# Patient Record
Sex: FEMALE | Race: WHITE | Employment: OTHER | URBAN - METROPOLITAN AREA
[De-identification: names, ages, dates, MRNs, and addresses within clinical notes are randomized per-mention and may not be internally consistent; named-entity substitution may affect disease eponyms.]

---

## 2017-11-10 PROCEDURE — 83036 HEMOGLOBIN GLYCOSYLATED A1C: CPT | Performed by: FAMILY MEDICINE

## 2018-03-02 PROBLEM — K60.2 ANAL FISSURE: Status: ACTIVE | Noted: 2018-03-02

## 2018-10-03 PROCEDURE — 81003 URINALYSIS AUTO W/O SCOPE: CPT | Performed by: COLON & RECTAL SURGERY

## 2018-10-29 RX ORDER — HYDROCODONE BITARTRATE AND ACETAMINOPHEN 10; 325 MG/1; MG/1
1 TABLET ORAL EVERY 6 HOURS PRN
COMMUNITY
End: 2018-12-12

## 2018-11-02 ENCOUNTER — ANESTHESIA (OUTPATIENT)
Dept: SURGERY | Facility: HOSPITAL | Age: 68
End: 2018-11-02
Payer: MEDICARE

## 2018-11-02 ENCOUNTER — HOSPITAL ENCOUNTER (OUTPATIENT)
Facility: HOSPITAL | Age: 68
Setting detail: HOSPITAL OUTPATIENT SURGERY
Discharge: HOME | End: 2018-11-02
Attending: COLON & RECTAL SURGERY | Admitting: COLON & RECTAL SURGERY
Payer: MEDICARE

## 2018-11-02 ENCOUNTER — ANESTHESIA EVENT (OUTPATIENT)
Dept: SURGERY | Facility: HOSPITAL | Age: 68
End: 2018-11-02
Payer: MEDICARE

## 2018-11-02 VITALS
WEIGHT: 179.13 LBS | OXYGEN SATURATION: 95 % | TEMPERATURE: 99 F | RESPIRATION RATE: 16 BRPM | SYSTOLIC BLOOD PRESSURE: 135 MMHG | DIASTOLIC BLOOD PRESSURE: 73 MMHG | HEART RATE: 60 BPM | BODY MASS INDEX: 30.58 KG/M2 | HEIGHT: 64 IN

## 2018-11-02 DIAGNOSIS — K80.20 CALCULUS OF GALLBLADDER WITHOUT CHOLECYSTITIS WITHOUT OBSTRUCTION: ICD-10-CM

## 2018-11-02 PROCEDURE — 0FT44ZZ RESECTION OF GALLBLADDER, PERCUTANEOUS ENDOSCOPIC APPROACH: ICD-10-PCS | Performed by: COLON & RECTAL SURGERY

## 2018-11-02 PROCEDURE — 88304 TISSUE EXAM BY PATHOLOGIST: CPT | Performed by: COLON & RECTAL SURGERY

## 2018-11-02 RX ORDER — LABETALOL HYDROCHLORIDE 5 MG/ML
INJECTION, SOLUTION INTRAVENOUS AS NEEDED
Status: DISCONTINUED | OUTPATIENT
Start: 2018-11-02 | End: 2018-11-02 | Stop reason: SURG

## 2018-11-02 RX ORDER — LIDOCAINE HYDROCHLORIDE 10 MG/ML
INJECTION, SOLUTION EPIDURAL; INFILTRATION; INTRACAUDAL; PERINEURAL AS NEEDED
Status: DISCONTINUED | OUTPATIENT
Start: 2018-11-02 | End: 2018-11-02 | Stop reason: SURG

## 2018-11-02 RX ORDER — DEXAMETHASONE SODIUM PHOSPHATE 4 MG/ML
VIAL (ML) INJECTION AS NEEDED
Status: DISCONTINUED | OUTPATIENT
Start: 2018-11-02 | End: 2018-11-02 | Stop reason: SURG

## 2018-11-02 RX ORDER — GLYCOPYRROLATE 0.2 MG/ML
INJECTION INTRAMUSCULAR; INTRAVENOUS AS NEEDED
Status: DISCONTINUED | OUTPATIENT
Start: 2018-11-02 | End: 2018-11-02 | Stop reason: SURG

## 2018-11-02 RX ORDER — HYDROCODONE BITARTRATE AND ACETAMINOPHEN 5; 325 MG/1; MG/1
2 TABLET ORAL AS NEEDED
Status: DISCONTINUED | OUTPATIENT
Start: 2018-11-02 | End: 2018-11-02

## 2018-11-02 RX ORDER — MORPHINE SULFATE 4 MG/ML
2 INJECTION, SOLUTION INTRAMUSCULAR; INTRAVENOUS EVERY 10 MIN PRN
Status: DISCONTINUED | OUTPATIENT
Start: 2018-11-02 | End: 2018-11-02

## 2018-11-02 RX ORDER — HALOPERIDOL 5 MG/ML
0.25 INJECTION INTRAMUSCULAR ONCE AS NEEDED
Status: DISCONTINUED | OUTPATIENT
Start: 2018-11-02 | End: 2018-11-02

## 2018-11-02 RX ORDER — FAMOTIDINE 20 MG/1
20 TABLET ORAL ONCE
Status: COMPLETED | OUTPATIENT
Start: 2018-11-02 | End: 2018-11-02

## 2018-11-02 RX ORDER — HYDROCODONE BITARTRATE AND ACETAMINOPHEN 5; 325 MG/1; MG/1
1 TABLET ORAL AS NEEDED
Status: DISCONTINUED | OUTPATIENT
Start: 2018-11-02 | End: 2018-11-02

## 2018-11-02 RX ORDER — ACETAMINOPHEN 500 MG
1000 TABLET ORAL ONCE
Status: COMPLETED | OUTPATIENT
Start: 2018-11-02 | End: 2018-11-02

## 2018-11-02 RX ORDER — ONDANSETRON 2 MG/ML
INJECTION INTRAMUSCULAR; INTRAVENOUS AS NEEDED
Status: DISCONTINUED | OUTPATIENT
Start: 2018-11-02 | End: 2018-11-02 | Stop reason: SURG

## 2018-11-02 RX ORDER — NALOXONE HYDROCHLORIDE 0.4 MG/ML
80 INJECTION, SOLUTION INTRAMUSCULAR; INTRAVENOUS; SUBCUTANEOUS AS NEEDED
Status: DISCONTINUED | OUTPATIENT
Start: 2018-11-02 | End: 2018-11-02

## 2018-11-02 RX ORDER — MAGNESIUM HYDROXIDE 1200 MG/15ML
LIQUID ORAL CONTINUOUS PRN
Status: COMPLETED | OUTPATIENT
Start: 2018-11-02 | End: 2018-11-02

## 2018-11-02 RX ORDER — HEPARIN SODIUM 5000 [USP'U]/ML
5000 INJECTION, SOLUTION INTRAVENOUS; SUBCUTANEOUS ONCE
Status: COMPLETED | OUTPATIENT
Start: 2018-11-02 | End: 2018-11-02

## 2018-11-02 RX ORDER — CEFAZOLIN SODIUM/WATER 2 G/20 ML
2 SYRINGE (ML) INTRAVENOUS ONCE
Status: COMPLETED | OUTPATIENT
Start: 2018-11-02 | End: 2018-11-02

## 2018-11-02 RX ORDER — NEOSTIGMINE METHYLSULFATE 0.5 MG/ML
INJECTION INTRAVENOUS AS NEEDED
Status: DISCONTINUED | OUTPATIENT
Start: 2018-11-02 | End: 2018-11-02 | Stop reason: SURG

## 2018-11-02 RX ORDER — MIDAZOLAM HYDROCHLORIDE 1 MG/ML
INJECTION INTRAMUSCULAR; INTRAVENOUS AS NEEDED
Status: DISCONTINUED | OUTPATIENT
Start: 2018-11-02 | End: 2018-11-02 | Stop reason: SURG

## 2018-11-02 RX ORDER — HYDROCODONE BITARTRATE AND ACETAMINOPHEN 5; 325 MG/1; MG/1
1 TABLET ORAL EVERY 4 HOURS PRN
Qty: 30 TABLET | Refills: 0 | Status: SHIPPED | OUTPATIENT
Start: 2018-11-02 | End: 2018-11-12

## 2018-11-02 RX ORDER — METOPROLOL TARTRATE 5 MG/5ML
2.5 INJECTION INTRAVENOUS ONCE
Status: DISCONTINUED | OUTPATIENT
Start: 2018-11-02 | End: 2018-11-02

## 2018-11-02 RX ORDER — METOCLOPRAMIDE 10 MG/1
10 TABLET ORAL ONCE
Status: COMPLETED | OUTPATIENT
Start: 2018-11-02 | End: 2018-11-02

## 2018-11-02 RX ORDER — SODIUM CHLORIDE, SODIUM LACTATE, POTASSIUM CHLORIDE, CALCIUM CHLORIDE 600; 310; 30; 20 MG/100ML; MG/100ML; MG/100ML; MG/100ML
INJECTION, SOLUTION INTRAVENOUS CONTINUOUS
Status: DISCONTINUED | OUTPATIENT
Start: 2018-11-02 | End: 2018-11-02

## 2018-11-02 RX ORDER — ROCURONIUM BROMIDE 10 MG/ML
INJECTION, SOLUTION INTRAVENOUS AS NEEDED
Status: DISCONTINUED | OUTPATIENT
Start: 2018-11-02 | End: 2018-11-02 | Stop reason: SURG

## 2018-11-02 RX ORDER — MORPHINE SULFATE 4 MG/ML
4 INJECTION, SOLUTION INTRAMUSCULAR; INTRAVENOUS EVERY 10 MIN PRN
Status: DISCONTINUED | OUTPATIENT
Start: 2018-11-02 | End: 2018-11-02

## 2018-11-02 RX ORDER — MORPHINE SULFATE 10 MG/ML
6 INJECTION, SOLUTION INTRAMUSCULAR; INTRAVENOUS EVERY 10 MIN PRN
Status: DISCONTINUED | OUTPATIENT
Start: 2018-11-02 | End: 2018-11-02

## 2018-11-02 RX ORDER — ONDANSETRON 2 MG/ML
4 INJECTION INTRAMUSCULAR; INTRAVENOUS ONCE AS NEEDED
Status: DISCONTINUED | OUTPATIENT
Start: 2018-11-02 | End: 2018-11-02

## 2018-11-02 RX ORDER — BUPIVACAINE HYDROCHLORIDE AND EPINEPHRINE 5; 5 MG/ML; UG/ML
INJECTION, SOLUTION PERINEURAL AS NEEDED
Status: DISCONTINUED | OUTPATIENT
Start: 2018-11-02 | End: 2018-11-02 | Stop reason: HOSPADM

## 2018-11-02 RX ADMIN — DEXAMETHASONE SODIUM PHOSPHATE 4 MG: 4 MG/ML VIAL (ML) INJECTION at 09:19:00

## 2018-11-02 RX ADMIN — ROCURONIUM BROMIDE 30 MG: 10 INJECTION, SOLUTION INTRAVENOUS at 09:19:00

## 2018-11-02 RX ADMIN — SODIUM CHLORIDE, SODIUM LACTATE, POTASSIUM CHLORIDE, CALCIUM CHLORIDE: 600; 310; 30; 20 INJECTION, SOLUTION INTRAVENOUS at 10:25:00

## 2018-11-02 RX ADMIN — LIDOCAINE HYDROCHLORIDE 50 MG: 10 INJECTION, SOLUTION EPIDURAL; INFILTRATION; INTRACAUDAL; PERINEURAL at 09:19:00

## 2018-11-02 RX ADMIN — GLYCOPYRROLATE 0.6 MG: 0.2 INJECTION INTRAMUSCULAR; INTRAVENOUS at 10:20:00

## 2018-11-02 RX ADMIN — MIDAZOLAM HYDROCHLORIDE 2 MG: 1 INJECTION INTRAMUSCULAR; INTRAVENOUS at 09:19:00

## 2018-11-02 RX ADMIN — NEOSTIGMINE METHYLSULFATE 3 MG: 0.5 INJECTION INTRAVENOUS at 10:20:00

## 2018-11-02 RX ADMIN — CEFAZOLIN SODIUM/WATER 2 G: 2 G/20 ML SYRINGE (ML) INTRAVENOUS at 09:20:00

## 2018-11-02 RX ADMIN — LABETALOL HYDROCHLORIDE 10 MG: 5 INJECTION, SOLUTION INTRAVENOUS at 09:48:00

## 2018-11-02 RX ADMIN — ONDANSETRON 4 MG: 2 INJECTION INTRAMUSCULAR; INTRAVENOUS at 09:19:00

## 2018-11-02 RX ADMIN — SODIUM CHLORIDE, SODIUM LACTATE, POTASSIUM CHLORIDE, CALCIUM CHLORIDE: 600; 310; 30; 20 INJECTION, SOLUTION INTRAVENOUS at 09:19:00

## 2018-11-02 NOTE — ANESTHESIA PROCEDURE NOTES
ANESTHESIA INTUBATION  Date/Time: 11/2/2018 9:25 AM  Urgency: elective    Airway not difficult    General Information and Staff    Patient location during procedure: OR  Anesthesiologist: Madeline Phillips MD  Performed: anesthesiologist     Indications and P

## 2018-11-02 NOTE — H&P
[facility]  Preop H&P - Colon and Rectal Surgery  Lola Sykes MD    CHIEF COMPLAINT:  Biliary colic     HISTORY OF PRESENT ILLNESS:  Tyler Campos is a 79year old female who presents for evaluation of gallbladder disease.     She has had a \"gas Crohn's disease   • Cancer Brother       Social History    Tobacco Use      Smoking status: Never Smoker      Smokeless tobacco: Never Used    Alcohol use: Yes      Comment: Socially    Drug use: No           CURRENT MEDICATIONS:      No current outpatient Appearance:  normal     Behavior:  normal     Attitude toward examiner:  normal     Affect: normal     Judgment:  Normal    DATA:     US ABDOMEN RUQ 9/6/18  CLINICAL INDICATION: Heartburn. COMPARISON STUDY: None available.   FINDINGS:  PANCREAS: Limited e ASSESSMENT AND PLAN:  Gallstones and thickened wall  Abdominal pain, atypical for biliary source     Other sources excluded with UA and CT    Ready to proceed with laparoscopic cholecystectomy      Procedure, indications, risks, benefits, and alt

## 2018-11-02 NOTE — ANESTHESIA POSTPROCEDURE EVALUATION
Patient: Janna Caraballo    Procedure Summary     Date:  11/02/18 Room / Location:  [facility] OR 02 / [facility] OR    Anesthesia Start:  7465 Anesthesia Stop:  6324    Procedure:  LAPAROSCOPIC CHOLECYSTECTOMY (N/A ) Diagnosis:       Calculus of gallbladder wit

## 2018-11-02 NOTE — ANESTHESIA PREPROCEDURE EVALUATION
Anesthesia PreOp Note    HPI:     Kylee Kent is a 79year old female who presents for preoperative consultation requested by: Sofia Mcpherson MD    Date of Surgery: 11/2/2018    Procedure(s):  LAPAROSCOPIC CHOLECYSTECTOMY  Indication: Calculus of gallbladd 11/1/2018 at 2000   HYDROcodone-acetaminophen  MG Oral Tab Take 1 tablet by mouth every 6 (six) hours as needed for Pain. Disp:  Rfl:  11/1/2018 at 2100   famoTIDine 20 MG Oral Tab Take 40 mg by mouth nightly.    Disp:  Rfl:  11/1/2018 at [address] [place] [address] Social History    Socioeconomic History      Marital status:       Spouse name: Not on file      Number of children: Not on file      Years of education: Not on file      Highest education level: Not on file    Social Needs      Financial resour Airway   Mallampati: II  TM distance: >3 FB  Neck ROM: full  Dental      Pulmonary - negative ROS and normal exam   Cardiovascular - normal exam  (+) hypertension,     Neuro/Psych - negative ROS     GI/Hepatic/Renal    (+) GERD,     Endo/Other    (+) hyper

## 2018-11-02 NOTE — DISCHARGE SUMMARY
Outpatient Surgery Brief Discharge Summary         Patient ID:  Ky Valdes  [id number]  79year old  7/29/1950    Discharge Diagnoses: Calculus of gallbladder without cholecystitis without obstruction [K80.20]    Procedures: Laparoscopic cholecystectomy

## 2018-11-02 NOTE — OPERATIVE REPORT
Operative Note    Patient Name: Carlton Walton    Preoperative Diagnosis: Calculus of gallbladder without cholecystitis without obstruction [K80.20]    Postoperative Diagnosis: same    Primary Surgeon: Ephraim Puri    Assistant: Sara Gregg [facility]    Procedures: patient positioning, prepping, instrument passing and holding, retraction, suturing, and camera holding.)        _____________________________________   Attending Surgeon: Lázaro Leyva MD   Dictated By: Lázaro Leyva MD

## 2018-12-12 PROCEDURE — 83036 HEMOGLOBIN GLYCOSYLATED A1C: CPT | Performed by: FAMILY MEDICINE

## 2021-11-18 ENCOUNTER — HOSPITAL ENCOUNTER (INPATIENT)
Facility: HOSPITAL | Age: 71
LOS: 2 days | Discharge: HOME | DRG: 392 | End: 2021-11-20
Attending: EMERGENCY MEDICINE | Admitting: HOSPITALIST
Payer: MEDICARE

## 2021-11-18 ENCOUNTER — APPOINTMENT (OUTPATIENT)
Dept: CT IMAGING | Facility: HOSPITAL | Age: 71
DRG: 392 | End: 2021-11-18
Attending: EMERGENCY MEDICINE
Payer: MEDICARE

## 2021-11-18 ENCOUNTER — APPOINTMENT (OUTPATIENT)
Dept: ULTRASOUND IMAGING | Facility: HOSPITAL | Age: 71
DRG: 392 | End: 2021-11-18
Attending: OBSTETRICS & GYNECOLOGY
Payer: MEDICARE

## 2021-11-18 DIAGNOSIS — K59.00 CONSTIPATION, UNSPECIFIED CONSTIPATION TYPE: ICD-10-CM

## 2021-11-18 DIAGNOSIS — R19.00 PELVIC MASS: Primary | ICD-10-CM

## 2021-11-18 PROCEDURE — 36415 COLL VENOUS BLD VENIPUNCTURE: CPT

## 2021-11-18 PROCEDURE — 74177 CT ABD & PELVIS W/CONTRAST: CPT | Performed by: EMERGENCY MEDICINE

## 2021-11-18 PROCEDURE — 76856 US EXAM PELVIC COMPLETE: CPT | Performed by: OBSTETRICS & GYNECOLOGY

## 2021-11-18 PROCEDURE — 86304 IMMUNOASSAY TUMOR CA 125: CPT | Performed by: OBSTETRICS & GYNECOLOGY

## 2021-11-18 PROCEDURE — 85025 COMPLETE CBC W/AUTO DIFF WBC: CPT | Performed by: EMERGENCY MEDICINE

## 2021-11-18 PROCEDURE — 99285 EMERGENCY DEPT VISIT HI MDM: CPT

## 2021-11-18 PROCEDURE — 82378 CARCINOEMBRYONIC ANTIGEN: CPT | Performed by: OBSTETRICS & GYNECOLOGY

## 2021-11-18 PROCEDURE — 80048 BASIC METABOLIC PNL TOTAL CA: CPT | Performed by: EMERGENCY MEDICINE

## 2021-11-18 PROCEDURE — 81001 URINALYSIS AUTO W/SCOPE: CPT | Performed by: EMERGENCY MEDICINE

## 2021-11-18 RX ORDER — POTASSIUM CHLORIDE 20 MEQ/1
40 TABLET, EXTENDED RELEASE ORAL ONCE
Status: COMPLETED | OUTPATIENT
Start: 2021-11-18 | End: 2021-11-18

## 2021-11-18 RX ORDER — HYDROCODONE BITARTRATE AND ACETAMINOPHEN 5; 325 MG/1; MG/1
1 TABLET ORAL EVERY 4 HOURS PRN
Status: DISCONTINUED | OUTPATIENT
Start: 2021-11-18 | End: 2021-11-20

## 2021-11-18 RX ORDER — ATORVASTATIN CALCIUM 10 MG/1
10 TABLET, FILM COATED ORAL NIGHTLY
Status: DISCONTINUED | OUTPATIENT
Start: 2021-11-18 | End: 2021-11-20

## 2021-11-18 RX ORDER — ONDANSETRON 2 MG/ML
4 INJECTION INTRAMUSCULAR; INTRAVENOUS EVERY 6 HOURS PRN
Status: DISCONTINUED | OUTPATIENT
Start: 2021-11-18 | End: 2021-11-20

## 2021-11-18 RX ORDER — METOPROLOL SUCCINATE 25 MG/1
25 TABLET, EXTENDED RELEASE ORAL DAILY
Status: DISCONTINUED | OUTPATIENT
Start: 2021-11-19 | End: 2021-11-20

## 2021-11-18 RX ORDER — POLYETHYLENE GLYCOL 3350 17 G/17G
17 POWDER, FOR SOLUTION ORAL DAILY
Status: DISCONTINUED | OUTPATIENT
Start: 2021-11-18 | End: 2021-11-20

## 2021-11-18 RX ORDER — DEXTROSE AND SODIUM CHLORIDE 5; .45 G/100ML; G/100ML
INJECTION, SOLUTION INTRAVENOUS CONTINUOUS
Status: DISCONTINUED | OUTPATIENT
Start: 2021-11-18 | End: 2021-11-20

## 2021-11-18 RX ORDER — SENNA AND DOCUSATE SODIUM 50; 8.6 MG/1; MG/1
2 TABLET, FILM COATED ORAL 2 TIMES DAILY
Status: DISCONTINUED | OUTPATIENT
Start: 2021-11-18 | End: 2021-11-20

## 2021-11-18 RX ORDER — HYDROCODONE BITARTRATE AND ACETAMINOPHEN 5; 325 MG/1; MG/1
2 TABLET ORAL EVERY 4 HOURS PRN
Status: DISCONTINUED | OUTPATIENT
Start: 2021-11-18 | End: 2021-11-20

## 2021-11-18 RX ORDER — SODIUM PHOSPHATE, DIBASIC AND SODIUM PHOSPHATE, MONOBASIC 7; 19 G/133ML; G/133ML
1 ENEMA RECTAL ONCE
Status: COMPLETED | OUTPATIENT
Start: 2021-11-18 | End: 2021-11-18

## 2021-11-18 RX ORDER — ACETAMINOPHEN 325 MG/1
650 TABLET ORAL EVERY 4 HOURS PRN
Status: DISCONTINUED | OUTPATIENT
Start: 2021-11-18 | End: 2021-11-20

## 2021-11-18 RX ORDER — HEPARIN SODIUM 5000 [USP'U]/ML
5000 INJECTION, SOLUTION INTRAVENOUS; SUBCUTANEOUS EVERY 8 HOURS SCHEDULED
Status: DISCONTINUED | OUTPATIENT
Start: 2021-11-18 | End: 2021-11-20

## 2021-11-18 RX ORDER — METOCLOPRAMIDE HYDROCHLORIDE 5 MG/ML
10 INJECTION INTRAMUSCULAR; INTRAVENOUS EVERY 8 HOURS PRN
Status: DISCONTINUED | OUTPATIENT
Start: 2021-11-18 | End: 2021-11-20

## 2021-11-18 NOTE — ED QUICK NOTES
Orders for admission, patient is aware of plan and ready to go upstairs. Any questions, please call ED RYLEY key at extension [phone].      Type of COVID test sent: rapid negative  COVID Suspicion level: Low/Vaccinated    LOC at time of transport: x 4    Other

## 2021-11-18 NOTE — CONSULTS
GYN Consult:    HPI:    Mrs. Alka Schmid  Is a 72 y/o postmenopausal female who presented to ED with complaint of constipation. Incidental finding on CT imaging shows a pelvic mass. GYN oncology was consulted. Dr. Robbi Torres has asked that I see the patient. de sac or adnexa   EXTREMITIES:  non tender  NEUROLOGIC: grossly intact  SKIN: moist, without lesions   PSYCHIATRIC: alert and oriented x 3; affect appropriate     ASSESSMENT/ PLAN:     Assessment:  1.  CT - necrotic pelvic mas (left) arising from adnexa, c

## 2021-11-18 NOTE — H&P
[facility] Hospitalist History and Physical      CC: Constipation, abd discomfort    PCP: Benito Wall DO    History of Present Illness: Patient is a 73year old female with PMH sig for HTN here with constipation for ~ 6 days.  Prior to developing these symptoms 40 mg by mouth nightly.  , Disp: , Rfl:           Current Meds:  Scheduled:   Continuous Infusions:   PRN:     Allergies:  No Known Allergies     Past Social Hx:  Denies heavy ETOH use  Denies illicts    Fam Hx  Family History   Problem Relation Age of Ons Large heterogeneously enhancing necrotic mass in the left pelvis deviating the uterus and bladder rightward, appears to be arising from the adnexa. Naila Hush is regional left inguinal adenopathy as well as retroperitoneal adenopathy on the left.     Findings

## 2021-11-18 NOTE — ED INITIAL ASSESSMENT (HPI)
Patient here with constipation, last BM on 11/12/21. Patient states she tried metamucil, sennakot and an soap suds enema with no success. Patient states she has hx of hemmorhoids. Patient denies abdominal pain, nausea or vomiting.

## 2021-11-18 NOTE — ED PROVIDER NOTES
Patient Seen in: [facility] AND [facility] Emergency Department      History   Patient presents with:  Constipation    Stated Complaint: constipation    Subjective:   HPI    80-year-old female with history of hypertension and esophageal reflux presents with comp (!) 170/93   Pulse 103   Resp 20   Temp 97.9 °F (36.6 °C)   Temp src    SpO2 99 %   O2 Device None (Room air)       Current:/83   Pulse 72   Temp 97.9 °F (36.6 °C)   Resp 18   Ht 165.1 cm (5' 5\")   Wt 86.2 kg   SpO2 98%   BMI 31.62 kg/m²         Phy Final result                 Please view results for these tests on the individual orders. RAPID SARS-COV-2 BY PCR                 MDM      Lab and CT results noted.   Will admit for further evaluation and treatment of pelvic mass with concern for ovarian

## 2021-11-19 PROCEDURE — 80048 BASIC METABOLIC PNL TOTAL CA: CPT | Performed by: HOSPITALIST

## 2021-11-19 PROCEDURE — 93005 ELECTROCARDIOGRAM TRACING: CPT

## 2021-11-19 PROCEDURE — 85025 COMPLETE CBC W/AUTO DIFF WBC: CPT | Performed by: HOSPITALIST

## 2021-11-19 PROCEDURE — 93010 ELECTROCARDIOGRAM REPORT: CPT | Performed by: HOSPITALIST

## 2021-11-19 PROCEDURE — 87493 C DIFF AMPLIFIED PROBE: CPT | Performed by: HOSPITALIST

## 2021-11-19 RX ORDER — BISACODYL 10 MG
10 SUPPOSITORY, RECTAL RECTAL ONCE
Status: COMPLETED | OUTPATIENT
Start: 2021-11-19 | End: 2021-11-19

## 2021-11-19 RX ORDER — SODIUM PHOSPHATE, DIBASIC AND SODIUM PHOSPHATE, MONOBASIC 7; 19 G/133ML; G/133ML
1 ENEMA RECTAL 2 TIMES DAILY
Status: COMPLETED | OUTPATIENT
Start: 2021-11-19 | End: 2021-11-20

## 2021-11-19 RX ORDER — POTASSIUM CHLORIDE 20 MEQ/1
40 TABLET, EXTENDED RELEASE ORAL ONCE
Status: COMPLETED | OUTPATIENT
Start: 2021-11-19 | End: 2021-11-19

## 2021-11-19 NOTE — PLAN OF CARE
VSS. Room Air. Remote tele in place with no calls. Ambulating self, independent after set up. Tolerating clears. IVFs infusing through L-AC PIV. Denies n/v. No c/o pain. Voiding. Fleet enema given. Two small loose BM.  Stool sample collected per protocol - stability  Description: INTERVENTIONS:  - Monitor vital signs, rhythm, and trends  - Monitor for bleeding, hypotension and signs of decreased cardiac output  - Evaluate effectiveness of vasoactive medications to optimize hemodynamic stability  - Monitor ar

## 2021-11-19 NOTE — PLAN OF CARE
Pt alert and oriented x4, forgetful at times. On room air. CLD, advanced to FLD, patient vomitted 1x before FLD trial (orange, unwitnessed by staff), reported feeling nauseous. PRN zofran given. MD notified, transitioned to NPO for general sx consult.  Griselda Bristol falls.  - [place] fall precautions as indicated by assessment.  - Educate pt/family on patient safety including physical limitations  - Instruct pt to call for assistance with activity based on assessment  - Modify environment to reduce risk of injury  - Progressing  Goal: Maintains or returns to baseline bowel function  Description: INTERVENTIONS:  - Assess bowel function  - Maintain adequate hydration with IV or PO as ordered and tolerated  - Evaluate effectiveness of GI medications  - Encourage mobiliza

## 2021-11-19 NOTE — CONSULTS
Urology Consultation  [facility] Emergency Department Urology    Place of Service: [facility] AND CLINICS  Reason for Consultation: Gross hematuria   Consult Requested by: Genny Holter, MD    History of Present Illness:  Silvana Calero is a 75F with PMHx HTN, hyperth enlargement.     KIDNEYS: Right kidney unremarkable.  Left kidney is without hydronephrosis and demonstrates normal cortical enhancement, however, there is mild left hydroureter which appears to be related to a left lower quadrant mass.    AORTA/VASCULAR:    The mass measures   approximately 13.6 x 8.2 x 9.3 cm.    BONES:   Mild facet arthropathy of the lower lumbar levels.  Large Schmorl's node at the superior endplate of L3.  Diffuse idiopathic skeletal hyperostosis of the lower thoracic spine.  Moderate to - responds to claritin   • HYPERTENSION    • HYPERTHYROIDISM     resolved    • Unspecified essential hypertension    • Unspecified menopausal and postmenopausal disorder     1999       Past Surgical History:   Procedure Laterality Date   • COLONOSCOPY tablet, Rfl: 0  HYDROCHLOROTHIAZIDE 25 MG Oral Tab, TAKE 1 TABLET(25 MG) BY MOUTH DAILY, Disp: 90 tablet, Rfl: 0  PRAVASTATIN 40 MG Oral Tab, TAKE 1 TABLET(40 MG) BY MOUTH EVERY NIGHT, Disp: 90 tablet, Rfl: 0  zolpidem 5 MG Oral Tab, Take 1 tablet (5 mg to stent placement for ureteral identification; possible left ureteral reimplant with psoas hitch, pending mass adherence to left ureter.     Josh Oliva MD  [facility] Urology

## 2021-11-19 NOTE — PROGRESS NOTES
Progress Note:    Pelvic sono confirms left adnexal masses: 1st is 9J9R3me and the other 6X2C1en - complex. Suspicion for malignancy. CEA,  are not elevated. Plan:    Follow up with GYN oncology 1 week after discharge.  No further inpatient manag

## 2021-11-19 NOTE — PROGRESS NOTES
[facility] Hospitalist Progress Note                                                                   [address]  7/29/1950    CC: FU constipation/pelvic mass    Interval History:  - Some small 78 84   GFRNAA 67 73   CA 9.4 9.0   * 133*   K 3.2* 3.1*   CL 97* 96*   CO2 25.0 29.0           ROS: no change to ROS from documentation yesterday, except as otherwise noted in the Interval History above.     Assessment/Plan:    # Constipation  - No c

## 2021-11-20 ENCOUNTER — APPOINTMENT (OUTPATIENT)
Dept: GENERAL RADIOLOGY | Facility: HOSPITAL | Age: 71
DRG: 392 | End: 2021-11-20
Attending: SURGERY
Payer: MEDICARE

## 2021-11-20 VITALS
RESPIRATION RATE: 16 BRPM | TEMPERATURE: 98 F | WEIGHT: 190 LBS | SYSTOLIC BLOOD PRESSURE: 121 MMHG | DIASTOLIC BLOOD PRESSURE: 88 MMHG | OXYGEN SATURATION: 98 % | BODY MASS INDEX: 31.65 KG/M2 | HEART RATE: 65 BPM | HEIGHT: 65 IN

## 2021-11-20 PROCEDURE — 74270 X-RAY XM COLON 1CNTRST STD: CPT | Performed by: SURGERY

## 2021-11-20 PROCEDURE — 80048 BASIC METABOLIC PNL TOTAL CA: CPT | Performed by: HOSPITALIST

## 2021-11-20 PROCEDURE — 84132 ASSAY OF SERUM POTASSIUM: CPT | Performed by: HOSPITALIST

## 2021-11-20 RX ORDER — SENNA AND DOCUSATE SODIUM 50; 8.6 MG/1; MG/1
2 TABLET, FILM COATED ORAL 2 TIMES DAILY
Qty: 180 TABLET | Refills: 0 | Status: SHIPPED | OUTPATIENT
Start: 2021-11-20

## 2021-11-20 RX ORDER — POTASSIUM CHLORIDE 20 MEQ/1
40 TABLET, EXTENDED RELEASE ORAL EVERY 4 HOURS
Status: COMPLETED | OUTPATIENT
Start: 2021-11-20 | End: 2021-11-20

## 2021-11-20 RX ORDER — POTASSIUM CHLORIDE 14.9 MG/ML
20 INJECTION INTRAVENOUS ONCE
Status: DISCONTINUED | OUTPATIENT
Start: 2021-11-20 | End: 2021-11-20

## 2021-11-20 RX ORDER — SODIUM CHLORIDE 9 MG/ML
INJECTION, SOLUTION INTRAVENOUS CONTINUOUS
Status: DISCONTINUED | OUTPATIENT
Start: 2021-11-20 | End: 2021-11-20

## 2021-11-20 RX ORDER — POLYETHYLENE GLYCOL 3350 17 G/17G
17 POWDER, FOR SOLUTION ORAL DAILY
Qty: 30 EACH | Refills: 0 | Status: SHIPPED | OUTPATIENT
Start: 2021-11-21 | End: 2021-11-30

## 2021-11-20 NOTE — PROGRESS NOTES
[facility]    Progress Note    Jethro Morillo Patient Status:  Inpatient    1950 MRN [id number]   Location [facility] 4W/SW/SE Attending Priscila Minor,*   Hosp Day # 2 PCP Pelon Rock, DO Jethro Morillo is a and turgor. Neurologic: Cranial nerves are grossly intact. Motor strength and sensory examination is grossly normal.  No focal neurologic deficit.          Results:       Recent Labs   Lab 11/18/21  1321 11/19/21  0622   RBC 4.93 4.50   HGB 15.1 13.6   HC Meg Montoya MD on 11/18/2021 at 3:10 PM          EKG 12 Lead    Result Date: 11/19/2021  ECG Report  Interpretation  -------------------------- Sinus Rhythm WITHIN NORMAL LIMITS No previous ECGs available Electronically signed on 11/19/2021 at 14:56 by Eber Aleman,

## 2021-11-20 NOTE — PLAN OF CARE
Forgetful. VSS. Room Air. Remote tele in place with no calls. Ambulating self, independent after set up. NPO. IVFs infusing through L-AC PIV. Denies n/v. No emesis. C/o of mild abdominal cramping which improved throughout the night per pt. Voiding.  Fleet CARDIOVASCULAR - ADULT  Goal: Maintains optimal cardiac output and hemodynamic stability  Description: INTERVENTIONS:  - Monitor vital signs, rhythm, and trends  - Monitor for bleeding, hypotension and signs of decreased cardiac output  - Evaluate effectiv

## 2021-11-20 NOTE — PROGRESS NOTES
[facility] Hospitalist Progress Note                                                                   [address]  7/29/1950    CC: FU constipation/pelvic mass    Interval History:  - No large s 25.0 29.0 26.0           ROS: no change to ROS from documentation yesterday, except as otherwise noted in the Interval History above.     Assessment/Plan:    # Constipation  - No clear bowel obstruction on imaging but with large mass occupying space in pelv

## 2021-11-20 NOTE — CONSULTS
[facility] HOSP - [facility]    Report of Consultation    Alka Schmid Patient Status:  Inpatient    1950 MRN [id number]   Location [facility] 4W/SW/SE Attending Edward Mortensen,*   Hosp Day # 1 PCP Denise Kang, DO     Date of Admi Other (Other) Sister         Crohn's disease   • Cancer Brother    • High Blood Pressure Son    • High Blood Pressure Daughter       reports that she has never smoked. She has never used smokeless tobacco. She reports current alcohol use.  She reports that (86.2 kg), SpO2 98 %. General: Alert, orientated x3. Cooperative. No apparent distress. Patient is overweight. HEENT: Exam is unremarkable. Without scleral icterus. Neck: No tenderness to palpitation. No JVD.     Lungs: Clear to auscultation bila regional left inguinal adenopathy as well as retroperitoneal adenopathy on the left. Findings are concerning for an ovarian malignancy with lymph node metastatic disease. 3.  Colonic diverticulosis without evidence for acute diverticulitis.   4. Mild left inflammation. I discussed with patient the options. I think best to try and manually disimpacted the first to see if we can loosen this up. Once we get this out we can give her clindamycin, patient refused with normal saline.   Plan Gastrografin enema in

## 2021-11-20 NOTE — PROGRESS NOTES
Urology- Daily Progress Note    Following for: left adnexal mass with intimate involvement of left distal ureter    Subjective  No overnight events. Just received enema. No flank pain. Voiding without difficulty.      Objective  /77 (BP Location: Rig placement for ureteral identification; possible left ureteral reimplant with psoas hitch, pending mass adherence to left ureter.  - Ongoing management of constipation per primary service, general surgery teams  - Urology will sign off at this time.  Please

## 2021-11-20 NOTE — PLAN OF CARE
Pt alert and oriented x4. VSS. On room air. Tolerating general diet, no nausea. Pt had gastrografin enema + xray today. Diarrhea persists. No complaints of nausea. Abdomen soft. Bowel regimen as ordered. Voiding freely. No complaints of pain.  Cleared for d patient safety including physical limitations  - Instruct pt to call for assistance with activity based on assessment  - Modify environment to reduce risk of injury  - Provide assistive devices as appropriate  - Consider OT/PT consult to assist with streng function  Description: INTERVENTIONS:  - Assess bowel function  - Maintain adequate hydration with IV or PO as ordered and tolerated  - Evaluate effectiveness of GI medications  - Encourage mobilization and activity  - Obtain nutritional consult as needed

## 2021-12-01 NOTE — DISCHARGE SUMMARY
General Medicine Discharge Summary     Patient ID:  Jose Eduardo Sebastian  73year old  7/29/1950    Admit date: 11/18/2021    Discharge date and time:  11/20/21    Attending Physician: No att. providers found Tolerating diet.  ANUJA Shaver for DC today.     # Large pelvic mass, concern for primary ovarian vs other gyn malignancy  - Gyn on consulted- ANUJA Lieberman [facility])  - Gyn service also consulted and have seen in house  - Pelvic US reviewed 2 large Oral Tab          Activity: activity as tolerated  Diet: regular diet  Wound Care: as directed  Code Status: No Order      Exam on day of discharge:      11/20/21  1316   BP: 121/88   Pulse: 65   Resp: 16   Temp: 97.8 °F (36.6 °C)       General: no acute d

## 2021-12-06 ENCOUNTER — LAB ENCOUNTER (OUTPATIENT)
Dept: LAB | Age: 71
DRG: 827 | End: 2021-12-06
Attending: OBSTETRICS & GYNECOLOGY
Payer: MEDICARE

## 2021-12-06 DIAGNOSIS — Z01.818 PREOP TESTING: ICD-10-CM

## 2021-12-06 PROCEDURE — 36415 COLL VENOUS BLD VENIPUNCTURE: CPT

## 2021-12-06 PROCEDURE — 84132 ASSAY OF SERUM POTASSIUM: CPT

## 2021-12-06 PROCEDURE — 86900 BLOOD TYPING SEROLOGIC ABO: CPT

## 2021-12-06 PROCEDURE — 86901 BLOOD TYPING SEROLOGIC RH(D): CPT

## 2021-12-06 PROCEDURE — 86850 RBC ANTIBODY SCREEN: CPT

## 2021-12-09 ENCOUNTER — HOSPITAL ENCOUNTER (INPATIENT)
Facility: HOSPITAL | Age: 71
LOS: 6 days | Discharge: HOME | DRG: 827 | End: 2021-12-15
Attending: OBSTETRICS & GYNECOLOGY | Admitting: OBSTETRICS & GYNECOLOGY
Payer: MEDICARE

## 2021-12-09 ENCOUNTER — APPOINTMENT (OUTPATIENT)
Dept: GENERAL RADIOLOGY | Facility: HOSPITAL | Age: 71
DRG: 827 | End: 2021-12-09
Attending: UROLOGY
Payer: MEDICARE

## 2021-12-09 ENCOUNTER — ANESTHESIA (OUTPATIENT)
Dept: SURGERY | Facility: HOSPITAL | Age: 71
DRG: 827 | End: 2021-12-09
Payer: MEDICARE

## 2021-12-09 ENCOUNTER — ANESTHESIA EVENT (OUTPATIENT)
Dept: SURGERY | Facility: HOSPITAL | Age: 71
DRG: 827 | End: 2021-12-09
Payer: MEDICARE

## 2021-12-09 DIAGNOSIS — Z01.818 PREOP TESTING: Primary | ICD-10-CM

## 2021-12-09 DIAGNOSIS — R19.00 PELVIC MASS: ICD-10-CM

## 2021-12-09 DIAGNOSIS — Z12.4 SCREENING FOR CERVICAL CANCER: ICD-10-CM

## 2021-12-09 PROCEDURE — 0WBH0ZZ EXCISION OF RETROPERITONEUM, OPEN APPROACH: ICD-10-PCS | Performed by: OBSTETRICS & GYNECOLOGY

## 2021-12-09 PROCEDURE — 07BC0ZX EXCISION OF PELVIS LYMPHATIC, OPEN APPROACH, DIAGNOSTIC: ICD-10-PCS | Performed by: OBSTETRICS & GYNECOLOGY

## 2021-12-09 PROCEDURE — 0UT70ZZ RESECTION OF BILATERAL FALLOPIAN TUBES, OPEN APPROACH: ICD-10-PCS | Performed by: OBSTETRICS & GYNECOLOGY

## 2021-12-09 PROCEDURE — BT1F1ZZ FLUOROSCOPY OF LEFT KIDNEY, URETER AND BLADDER USING LOW OSMOLAR CONTRAST: ICD-10-PCS | Performed by: UROLOGY

## 2021-12-09 PROCEDURE — 0UT20ZZ RESECTION OF BILATERAL OVARIES, OPEN APPROACH: ICD-10-PCS | Performed by: OBSTETRICS & GYNECOLOGY

## 2021-12-09 PROCEDURE — 99221 1ST HOSP IP/OBS SF/LOW 40: CPT | Performed by: SURGERY

## 2021-12-09 PROCEDURE — 0T788DZ DILATION OF BILATERAL URETERS WITH INTRALUMINAL DEVICE, VIA NATURAL OR ARTIFICIAL OPENING ENDOSCOPIC: ICD-10-PCS | Performed by: UROLOGY

## 2021-12-09 DEVICE — URETERAL STENT
Type: IMPLANTABLE DEVICE | Site: URETER | Status: FUNCTIONAL
Brand: ASCERTA™

## 2021-12-09 RX ORDER — MORPHINE SULFATE 4 MG/ML
2 INJECTION, SOLUTION INTRAMUSCULAR; INTRAVENOUS EVERY 10 MIN PRN
Status: DISCONTINUED | OUTPATIENT
Start: 2021-12-09 | End: 2021-12-09 | Stop reason: HOSPADM

## 2021-12-09 RX ORDER — HYDROMORPHONE HYDROCHLORIDE 1 MG/ML
0.2 INJECTION, SOLUTION INTRAMUSCULAR; INTRAVENOUS; SUBCUTANEOUS EVERY 5 MIN PRN
Status: DISCONTINUED | OUTPATIENT
Start: 2021-12-09 | End: 2021-12-09 | Stop reason: HOSPADM

## 2021-12-09 RX ORDER — ENOXAPARIN SODIUM 100 MG/ML
40 INJECTION SUBCUTANEOUS DAILY
Status: DISCONTINUED | OUTPATIENT
Start: 2021-12-10 | End: 2021-12-15

## 2021-12-09 RX ORDER — METOCLOPRAMIDE 10 MG/1
10 TABLET ORAL ONCE
Status: COMPLETED | OUTPATIENT
Start: 2021-12-09 | End: 2021-12-09

## 2021-12-09 RX ORDER — FAMOTIDINE 20 MG/1
20 TABLET ORAL 2 TIMES DAILY
Status: DISCONTINUED | OUTPATIENT
Start: 2021-12-09 | End: 2021-12-10

## 2021-12-09 RX ORDER — HYDROMORPHONE HYDROCHLORIDE 1 MG/ML
0.6 INJECTION, SOLUTION INTRAMUSCULAR; INTRAVENOUS; SUBCUTANEOUS EVERY 5 MIN PRN
Status: DISCONTINUED | OUTPATIENT
Start: 2021-12-09 | End: 2021-12-09 | Stop reason: HOSPADM

## 2021-12-09 RX ORDER — ONDANSETRON 2 MG/ML
4 INJECTION INTRAMUSCULAR; INTRAVENOUS ONCE AS NEEDED
Status: DISCONTINUED | OUTPATIENT
Start: 2021-12-09 | End: 2021-12-09 | Stop reason: HOSPADM

## 2021-12-09 RX ORDER — NALOXONE HYDROCHLORIDE 0.4 MG/ML
80 INJECTION, SOLUTION INTRAMUSCULAR; INTRAVENOUS; SUBCUTANEOUS AS NEEDED
Status: DISCONTINUED | OUTPATIENT
Start: 2021-12-09 | End: 2021-12-09 | Stop reason: HOSPADM

## 2021-12-09 RX ORDER — LIDOCAINE HYDROCHLORIDE 10 MG/ML
INJECTION, SOLUTION EPIDURAL; INFILTRATION; INTRACAUDAL; PERINEURAL AS NEEDED
Status: DISCONTINUED | OUTPATIENT
Start: 2021-12-09 | End: 2021-12-09 | Stop reason: SURG

## 2021-12-09 RX ORDER — OXYCODONE HYDROCHLORIDE 5 MG/1
10 TABLET ORAL EVERY 4 HOURS PRN
Status: DISCONTINUED | OUTPATIENT
Start: 2021-12-09 | End: 2021-12-15

## 2021-12-09 RX ORDER — ONDANSETRON 2 MG/ML
4 INJECTION INTRAMUSCULAR; INTRAVENOUS EVERY 8 HOURS PRN
Status: DISCONTINUED | OUTPATIENT
Start: 2021-12-09 | End: 2021-12-15

## 2021-12-09 RX ORDER — HYDROMORPHONE HYDROCHLORIDE 1 MG/ML
0.8 INJECTION, SOLUTION INTRAMUSCULAR; INTRAVENOUS; SUBCUTANEOUS EVERY 2 HOUR PRN
Status: DISCONTINUED | OUTPATIENT
Start: 2021-12-09 | End: 2021-12-15

## 2021-12-09 RX ORDER — MORPHINE SULFATE 10 MG/ML
6 INJECTION, SOLUTION INTRAMUSCULAR; INTRAVENOUS EVERY 10 MIN PRN
Status: DISCONTINUED | OUTPATIENT
Start: 2021-12-09 | End: 2021-12-09 | Stop reason: HOSPADM

## 2021-12-09 RX ORDER — HYDROCODONE BITARTRATE AND ACETAMINOPHEN 5; 325 MG/1; MG/1
1 TABLET ORAL AS NEEDED
Status: DISCONTINUED | OUTPATIENT
Start: 2021-12-09 | End: 2021-12-09 | Stop reason: HOSPADM

## 2021-12-09 RX ORDER — DEXAMETHASONE SODIUM PHOSPHATE 4 MG/ML
VIAL (ML) INJECTION AS NEEDED
Status: DISCONTINUED | OUTPATIENT
Start: 2021-12-09 | End: 2021-12-09 | Stop reason: SURG

## 2021-12-09 RX ORDER — HALOPERIDOL 5 MG/ML
0.25 INJECTION INTRAMUSCULAR ONCE AS NEEDED
Status: DISCONTINUED | OUTPATIENT
Start: 2021-12-09 | End: 2021-12-09 | Stop reason: HOSPADM

## 2021-12-09 RX ORDER — HYDROCODONE BITARTRATE AND ACETAMINOPHEN 5; 325 MG/1; MG/1
2 TABLET ORAL AS NEEDED
Status: DISCONTINUED | OUTPATIENT
Start: 2021-12-09 | End: 2021-12-09 | Stop reason: HOSPADM

## 2021-12-09 RX ORDER — ACETAMINOPHEN 500 MG
1000 TABLET ORAL ONCE
Status: COMPLETED | OUTPATIENT
Start: 2021-12-09 | End: 2021-12-09

## 2021-12-09 RX ORDER — SIMETHICONE 80 MG
80 TABLET,CHEWABLE ORAL
Status: DISCONTINUED | OUTPATIENT
Start: 2021-12-09 | End: 2021-12-15

## 2021-12-09 RX ORDER — SODIUM CHLORIDE, SODIUM LACTATE, POTASSIUM CHLORIDE, CALCIUM CHLORIDE 600; 310; 30; 20 MG/100ML; MG/100ML; MG/100ML; MG/100ML
INJECTION, SOLUTION INTRAVENOUS CONTINUOUS
Status: DISCONTINUED | OUTPATIENT
Start: 2021-12-09 | End: 2021-12-11

## 2021-12-09 RX ORDER — HYDROMORPHONE HYDROCHLORIDE 1 MG/ML
0.4 INJECTION, SOLUTION INTRAMUSCULAR; INTRAVENOUS; SUBCUTANEOUS EVERY 2 HOUR PRN
Status: DISCONTINUED | OUTPATIENT
Start: 2021-12-09 | End: 2021-12-15

## 2021-12-09 RX ORDER — ACETAMINOPHEN 325 MG/1
650 TABLET ORAL EVERY 6 HOURS PRN
Status: DISCONTINUED | OUTPATIENT
Start: 2021-12-09 | End: 2021-12-15

## 2021-12-09 RX ORDER — PROCHLORPERAZINE EDISYLATE 5 MG/ML
5 INJECTION INTRAMUSCULAR; INTRAVENOUS ONCE AS NEEDED
Status: DISCONTINUED | OUTPATIENT
Start: 2021-12-09 | End: 2021-12-09 | Stop reason: HOSPADM

## 2021-12-09 RX ORDER — OXYCODONE HYDROCHLORIDE 5 MG/1
5 TABLET ORAL EVERY 4 HOURS PRN
Status: DISCONTINUED | OUTPATIENT
Start: 2021-12-09 | End: 2021-12-15

## 2021-12-09 RX ORDER — ONDANSETRON 2 MG/ML
INJECTION INTRAMUSCULAR; INTRAVENOUS AS NEEDED
Status: DISCONTINUED | OUTPATIENT
Start: 2021-12-09 | End: 2021-12-09 | Stop reason: SURG

## 2021-12-09 RX ORDER — MORPHINE SULFATE 4 MG/ML
4 INJECTION, SOLUTION INTRAMUSCULAR; INTRAVENOUS EVERY 10 MIN PRN
Status: DISCONTINUED | OUTPATIENT
Start: 2021-12-09 | End: 2021-12-09 | Stop reason: HOSPADM

## 2021-12-09 RX ORDER — METOPROLOL TARTRATE 5 MG/5ML
2.5 INJECTION INTRAVENOUS ONCE
Status: DISCONTINUED | OUTPATIENT
Start: 2021-12-09 | End: 2021-12-09 | Stop reason: HOSPADM

## 2021-12-09 RX ORDER — ROCURONIUM BROMIDE 10 MG/ML
INJECTION, SOLUTION INTRAVENOUS AS NEEDED
Status: DISCONTINUED | OUTPATIENT
Start: 2021-12-09 | End: 2021-12-09 | Stop reason: SURG

## 2021-12-09 RX ORDER — FAMOTIDINE 20 MG/1
20 TABLET ORAL ONCE
Status: DISCONTINUED | OUTPATIENT
Start: 2021-12-09 | End: 2021-12-09 | Stop reason: HOSPADM

## 2021-12-09 RX ORDER — SODIUM CHLORIDE, SODIUM LACTATE, POTASSIUM CHLORIDE, CALCIUM CHLORIDE 600; 310; 30; 20 MG/100ML; MG/100ML; MG/100ML; MG/100ML
INJECTION, SOLUTION INTRAVENOUS CONTINUOUS
Status: DISCONTINUED | OUTPATIENT
Start: 2021-12-09 | End: 2021-12-09 | Stop reason: HOSPADM

## 2021-12-09 RX ORDER — CEFAZOLIN SODIUM/WATER 2 G/20 ML
2 SYRINGE (ML) INTRAVENOUS EVERY 8 HOURS
Status: COMPLETED | OUTPATIENT
Start: 2021-12-09 | End: 2021-12-10

## 2021-12-09 RX ORDER — HYDROMORPHONE HYDROCHLORIDE 1 MG/ML
0.4 INJECTION, SOLUTION INTRAMUSCULAR; INTRAVENOUS; SUBCUTANEOUS EVERY 5 MIN PRN
Status: DISCONTINUED | OUTPATIENT
Start: 2021-12-09 | End: 2021-12-09 | Stop reason: HOSPADM

## 2021-12-09 RX ORDER — ONDANSETRON 4 MG/1
4 TABLET, FILM COATED ORAL EVERY 8 HOURS PRN
Status: DISCONTINUED | OUTPATIENT
Start: 2021-12-09 | End: 2021-12-15

## 2021-12-09 RX ORDER — CEFAZOLIN SODIUM/WATER 2 G/20 ML
2 SYRINGE (ML) INTRAVENOUS ONCE
Status: COMPLETED | OUTPATIENT
Start: 2021-12-09 | End: 2021-12-09

## 2021-12-09 RX ORDER — MIDAZOLAM HYDROCHLORIDE 1 MG/ML
INJECTION INTRAMUSCULAR; INTRAVENOUS AS NEEDED
Status: DISCONTINUED | OUTPATIENT
Start: 2021-12-09 | End: 2021-12-09 | Stop reason: SURG

## 2021-12-09 RX ADMIN — CEFAZOLIN SODIUM/WATER 2 G: 2 G/20 ML SYRINGE (ML) INTRAVENOUS at 12:25:00

## 2021-12-09 RX ADMIN — ROCURONIUM BROMIDE 10 MG: 10 INJECTION, SOLUTION INTRAVENOUS at 13:16:00

## 2021-12-09 RX ADMIN — ROCURONIUM BROMIDE 10 MG: 10 INJECTION, SOLUTION INTRAVENOUS at 14:26:00

## 2021-12-09 RX ADMIN — SODIUM CHLORIDE, SODIUM LACTATE, POTASSIUM CHLORIDE, CALCIUM CHLORIDE: 600; 310; 30; 20 INJECTION, SOLUTION INTRAVENOUS at 15:38:00

## 2021-12-09 RX ADMIN — LIDOCAINE HYDROCHLORIDE 50 MG: 10 INJECTION, SOLUTION EPIDURAL; INFILTRATION; INTRACAUDAL; PERINEURAL at 12:18:00

## 2021-12-09 RX ADMIN — SODIUM CHLORIDE, SODIUM LACTATE, POTASSIUM CHLORIDE, CALCIUM CHLORIDE: 600; 310; 30; 20 INJECTION, SOLUTION INTRAVENOUS at 14:30:00

## 2021-12-09 RX ADMIN — ONDANSETRON 4 MG: 2 INJECTION INTRAMUSCULAR; INTRAVENOUS at 15:13:00

## 2021-12-09 RX ADMIN — DEXAMETHASONE SODIUM PHOSPHATE 4 MG: 4 MG/ML VIAL (ML) INJECTION at 12:25:00

## 2021-12-09 RX ADMIN — ROCURONIUM BROMIDE 50 MG: 10 INJECTION, SOLUTION INTRAVENOUS at 12:18:00

## 2021-12-09 RX ADMIN — MIDAZOLAM HYDROCHLORIDE 2 MG: 1 INJECTION INTRAMUSCULAR; INTRAVENOUS at 12:12:00

## 2021-12-09 RX ADMIN — ROCURONIUM BROMIDE 10 MG: 10 INJECTION, SOLUTION INTRAVENOUS at 13:50:00

## 2021-12-09 RX ADMIN — ROCURONIUM BROMIDE 10 MG: 10 INJECTION, SOLUTION INTRAVENOUS at 13:10:00

## 2021-12-09 NOTE — OPERATIVE REPORT
Urology Operative Note    Date of Service: 2021  Name: Nick Freeman  MRN: [id number]  : 1950    Surgeon: Ron Hammond MD    Surgery: Cystourethroscopy, left retrograde pyelogram, 6Fr x 26 cm double J stent placement on left, 5Fr open ended be removed in clinic; timing of removal dependent on intraoperative findings.     Juan Michaels MD

## 2021-12-09 NOTE — CONSULTS
[facility]    Patient Name:  Missael José   YOB: 1950   Gender:  Female   Appt Date:  11/30/2021   Provider:  No name on file.    Insurance:  MEDICARE PART A&B     PATIENT PROVIDERS  Referring Galina problem     arthritic    • colloid nodule     Thyroid gland left lobe - s/p FNA 8/2/11 - neg for malignancy   • Disorder of thyroid    • Esophageal reflux    • High blood pressure    • High cholesterol    • HYPERTENSION    • HYPERTHYROIDISM     resolved cardiac septal defect   • Hypertension Mother    • Other (Other) Sister         Crohn's disease   • Cancer Brother         pancreatic   • Stroke Maternal Grandmother    • Heart Attack Maternal Grandfather    • High Blood Pressure Son    • High Blood Press aneurysm or dissection.    RETROPERITONEUM: There is an enlarged left retroperitoneal lymph node measuring 1.4 x 1.0 cm.  Numerous additional smaller prominent retroperitoneal lymph nodes are noted, 2nd largest is on the left just distal to the iliac bifurc L5-S1.  Moderate   degenerative disc change at L3-L4.  Minimal grade 1 retrolisthesis of L3 on L4. No acute fracture or focal destructive bone lesion noted in the imaged volume. LUNG BASES: No visible pulmonary or pleural disease.    OTHER: Negative.   MD  Complex General Surgical Oncology  [facility]  Pager [phone]  FRANCINE. [email or web address]        Follow Up:  No follow-ups on file. Electronically Signed by: No name on file.

## 2021-12-09 NOTE — BRIEF OP NOTE
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

## 2021-12-09 NOTE — H&P
HISTORY OF PRESENT ILLNESS:   Jace Bermudez is a 73year old female who has had a recent history of constipation, pelvic mass found on imaging at [facility]. Patient had abdominal/pelvic CT scan and US that showed pelvic mass.   Patient was referred by Dr. Helen Parker tablet (25 mg total) by mouth 3 (three) times daily as needed for Itching., Disp: 90 tablet, Rfl: 3  clotrimazole-betamethasone 1-0.05 % External Cream, Apply to affected area twice daily, Disp: 60 g, Rfl: 0  famoTIDine 20 MG Oral Tab, Take 40 mg by mouth small hiatal hernia.  There is focal thickening at the GE junction as well.  Stomach is otherwise grossly unremarkable.  Duodenum is grossly unremarkable.    PANCREAS: No lesion, fluid collection, ductal dilatation, or atrophy.     ADRENALS: No defined mass extends into the left inguinal   region and along the left pelvic sidewall.  The mass abuts and mildly to moderately narrows the left external iliac artery.  Patency of the left external iliac vein is difficult to determine due to the phase of contrast enh findings as above.                  PROCEDURE: US PELVIS (TRANSABDOMINAL PELVIS) (ESD=60863)       COMPARISON: [facility], CT ABDOMEN PELVIS IV CONTRAST NO ORAL (ER), 11/18/2021, 2:33 PM.       INDICATIONS: pelvic mass on CT       TECHNIQUE Ectopic0  Multiple0  Live Births2      SOCIAL HISTORY:  Patient lives at home, with spouse.     Ambulatory Status: NA  Performance Status:  0 - Fully active, able to carry on all pre-disease performance without restriction.       Social History       Socio ROS: negative  Endocrine ROS: negative  Hematological and Lymphatic ROS: negative     PHYSICAL EXAMINATION:  /80   Ht 5' 5\" (1.651 m)   Wt 189 lb (85.7 kg)   BMI 31.45 kg/m²   Body mass index is 31.45 kg/m².     GENERAL: alert and oriented, cooperat served by surgical resection followed by adjuvant therapy if needed.  IR guided biopsy is also an option but mass appears resectable, albeit slightly higher risk due to hydronephrosis and vessel compression.      Surgical intervention planned: Xlap/ELENA/BSO;

## 2021-12-09 NOTE — ANESTHESIA PROCEDURE NOTES
Airway  Date/Time: 12/9/2021 12:19 PM  Urgency: Elective      General Information and Staff    Patient location during procedure: OR  Anesthesiologist: Warner Dowling MD  Resident/CRNA: Nelda New CRNA  Performed: CRNA     Indications and Patient Condi

## 2021-12-09 NOTE — ANESTHESIA PREPROCEDURE EVALUATION
Anesthesia PreOp Note    HPI:     Haja Bravo is a 73year old female who presents for preoperative consultation requested by:  Cali Munoz MD    Date of Surgery: 12/9/2021    Procedure(s):  EXPLORATORY OF LAPAROTOMY, TOTAL ABDOMINAL HYSTERECTOMY/ JUWAN • COLONOSCOPY,DIAGNOSTIC  10/26/09    Performed by Cielo Hoffmann at [facility]   • CYST ASPIRATION LEFT  1990s    breast   • CYSTOSCOPY,INSERT URETERAL STENT  12/09/2021    EXPLORATORY OF LAPAROTOMY, TOTAL ABDOMINAL HYSTERECTOMY/ BILATERA metoprolol tartrate (LOPRESSOR) tab 25 mg, 25 mg, Oral, Once PRN, Abena Whitmore MD  ceFAZolin (ANCEF/KEFZOL) 2 GM/20ML premix IV syringe 2 g, 2 g, Intravenous, Once, Abena Whitmore MD    No current [facility]-ordered outpatient medications on file.         Bharti Block 12.9 11/19/2021    .0 11/19/2021     Lab Results   Component Value Date     (L) 11/20/2021    K 4.2 12/06/2021    CL 98 11/20/2021    CO2 26.0 11/20/2021    BUN 7 11/20/2021    CREATSERUM 0.92 11/20/2021     (H) 11/20/2021    CA 8.7 11/

## 2021-12-09 NOTE — CONSULTS
[facility]  TTR:[birth year]  DTA:[id number]  LOS:0    Date of Admission:  12/9/2021  Date of Consult:  12/9/2021     Re • TONSILLECTOMY        Family History   Problem Relation Age of Onset   • Heart Disorder Father    • Heart Disorder Mother         cardiac septal defect   • Hypertension Mother    • Other (Other) Sister         Crohn's disease   • Cancer Brother Careful dissection of the iliac artery showed that the medial vein was encased by tumor. 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   At this point I did

## 2021-12-10 PROCEDURE — 99231 SBSQ HOSP IP/OBS SF/LOW 25: CPT | Performed by: PHYSICIAN ASSISTANT

## 2021-12-10 RX ORDER — SENNA AND DOCUSATE SODIUM 50; 8.6 MG/1; MG/1
2 TABLET, FILM COATED ORAL 2 TIMES DAILY
Status: DISCONTINUED | OUTPATIENT
Start: 2021-12-10 | End: 2021-12-15

## 2021-12-10 RX ORDER — ZOLPIDEM TARTRATE 5 MG/1
5 TABLET ORAL NIGHTLY PRN
Status: DISCONTINUED | OUTPATIENT
Start: 2021-12-10 | End: 2021-12-15

## 2021-12-10 RX ORDER — POLYETHYLENE GLYCOL 3350 17 G/17G
17 POWDER, FOR SOLUTION ORAL DAILY PRN
Status: DISCONTINUED | OUTPATIENT
Start: 2021-12-10 | End: 2021-12-15

## 2021-12-10 RX ORDER — BISACODYL 10 MG
10 SUPPOSITORY, RECTAL RECTAL
Status: DISCONTINUED | OUTPATIENT
Start: 2021-12-10 | End: 2021-12-15

## 2021-12-10 RX ORDER — DOCUSATE SODIUM 100 MG/1
100 CAPSULE, LIQUID FILLED ORAL 2 TIMES DAILY
Status: DISCONTINUED | OUTPATIENT
Start: 2021-12-10 | End: 2021-12-10

## 2021-12-10 RX ORDER — ATORVASTATIN CALCIUM 10 MG/1
10 TABLET, FILM COATED ORAL NIGHTLY
Refills: 0 | Status: DISCONTINUED | OUTPATIENT
Start: 2021-12-10 | End: 2021-12-15

## 2021-12-10 RX ORDER — FAMOTIDINE 20 MG/1
20 TABLET ORAL NIGHTLY
Status: DISCONTINUED | OUTPATIENT
Start: 2021-12-10 | End: 2021-12-15

## 2021-12-10 RX ORDER — METOPROLOL SUCCINATE 25 MG/1
25 TABLET, EXTENDED RELEASE ORAL DAILY
Status: DISCONTINUED | OUTPATIENT
Start: 2021-12-10 | End: 2021-12-15

## 2021-12-10 RX ORDER — HYDROXYZINE HYDROCHLORIDE 25 MG/1
25 TABLET, FILM COATED ORAL 3 TIMES DAILY PRN
Status: DISCONTINUED | OUTPATIENT
Start: 2021-12-10 | End: 2021-12-11

## 2021-12-10 NOTE — CM/SW NOTE
12/10/21 1400   CM/SW Referral Data   Referral Source Social Work (self-referral)   Reason for Referral Discharge planning   Informant Patient   Pertinent Medical Hx   Does patient have an established PCP? Yes   Patient Info   Advanced directives?  Yes

## 2021-12-10 NOTE — PROGRESS NOTES
[facility] Pharmacy Note:  Renal Dose Adjustment    Jace Bermudez has been prescribed famotidine (PEPCID) 40 mg orally every 24 hours. Estimated Creatinine Clearance: 44.2 mL/min (A) (based on SCr of 1.08 mg/dL (H)).     Calculated creatinine clearance is < 50

## 2021-12-10 NOTE — CONSULTS
Hematology/Oncology Consult Note        NAME: Leni Moreno - ROOM: 857/131-V - MRN: [id number] - Age: 73year old - : 1950    Reason for Consult:  RP mass    Patient is a 73 y.o female who is currently admitted POD #1 with large pelvic mass.  This Relation Age of Onset   • Heart Disorder Father    • Heart Disorder Mother         cardiac septal defect   • Hypertension Mother    • Other (Other) Sister         Crohn's disease   • Cancer Brother         pancreatic   • Stroke Maternal Grandmother    • He 53*   CA  --  8.6   NA  --  141   K 4.2 3.8   CL  --  105   CO2  --  26.0       Imagin.  Large amount of stool throughout the colon with well-formed ball of stool in the rectum, consistent with given history of constipation. Shelby Silverman is circumferentia for allowing me to participate in the care of this patient, please call with any questions.     Fortino Snellen, M.D.  [facility] Hematology and Oncology

## 2021-12-10 NOTE — PROGRESS NOTES
Surgical Oncology Inpatient Progress Note    Subjective:  POD 1 ex lap, BSO, pelvic mass biopsy yesterday    Objective:  Temp:  [97.6 °F (36.4 °C)-98.8 °F (37.1 °C)] 98.1 °F (36.7 °C)  Pulse:  [68-97] 97  Resp:  [11-28] 18  BP: (119-155)/(62-87) 119/78

## 2021-12-10 NOTE — PLAN OF CARE
Received pt from PACU. Drowsy but easily arousable. On 2L O2 NC. Midline incision with Silveron dressing. Small amount of serosanguinous drainage. JANETH drain to bulb suction. Output now serosanguinous. Abdominal binder in place.  Garsia draining to gravity, sa anticipated neutropenic period  Description: INTERVENTIONS  - Monitor WBC  - Administer growth factors as ordered  - Implement neutropenic guidelines  Outcome: Progressing     Problem: SAFETY ADULT - FALL  Goal: Free from fall injury  Description: Vaishali Nelson

## 2021-12-10 NOTE — H&P
[facility] Hospitalist H&P     CC: Postoperative management    PCP: Mishel Davidson DO    Admitted 12/9/2021  Assessment and Plan     Vikash Given is a 73year old female with PMH sig for hypertension, hyperlipidemia, hypothyroidism who presented for ex lap, BSO questions and note understanding and agreeing with therapeutic plan as outlined    D/w Son and  at bedside      Thank You,  Hema Mckeon MD 12/10/2021  Hospitalist  [facility]   Answering service: [phone]      ABHISHEK Dexter RESECTION, OSTOMY, CYSTO BILATERAL URETERAL STENTS     • OBSTETRICAL CARE,VAG DELIV ONLY      x's 2   • REMOVAL OF TONSILS,12+ Y/O     • SHOULDER ARTHROSCOPY Right    • TONSILLECTOMY          ALL:    Orange Juice [Orang*    HIVES  Strawberries            H perfused  Psych: mood stable, cooperative  Neuro: No focal deficits    Diagnostic Data:    CBC/Chem  Recent Labs   Lab 12/10/21  0623   WBC 9.0   HGB 11.0*   MCV 94.7   .0       Recent Labs   Lab 12/06/21  1320 12/10/21  0623   NA  --  141   K 4.2 3

## 2021-12-10 NOTE — PROGRESS NOTES
Surgical Oncology Inpatient Progress Note    Subjective:  S/p ex lap, BSO, pelvic lymph node biopsy yesterday    Objective:  Temp:  [97.6 °F (36.4 °C)-98.8 °F (37.1 °C)] 98.1 °F (36.7 °C)  Pulse:  [68-97] 97  Resp:  [11-28] 18  BP: (119-155)/(62-87) 119/78

## 2021-12-10 NOTE — PROGRESS NOTES
176 [facility] Progress Note      Janna Caraballo Patient Status:  Inpatient    1950 MRN [id number]   Location [facility] 4W/SW/SE Attending Krzysztof Mcqueen MD   Hosp Day # 1 PCP Luke Hays DO     Assessment & Plan: Catheter Information  Does patient have a bryant catheter: yes  Has the bryant catheter been removed: No  Reason for continuing: gyne or perineal surgery   Other reason for insertion/continuing: hematria     Gastrointestinal: hypoactive bowel sounds  abdomen

## 2021-12-11 ENCOUNTER — APPOINTMENT (OUTPATIENT)
Dept: CT IMAGING | Facility: HOSPITAL | Age: 71
DRG: 827 | End: 2021-12-11
Attending: HOSPITALIST
Payer: MEDICARE

## 2021-12-11 ENCOUNTER — APPOINTMENT (OUTPATIENT)
Dept: ULTRASOUND IMAGING | Facility: HOSPITAL | Age: 71
DRG: 827 | End: 2021-12-11
Attending: HOSPITALIST
Payer: MEDICARE

## 2021-12-11 PROCEDURE — 99231 SBSQ HOSP IP/OBS SF/LOW 25: CPT | Performed by: SURGERY

## 2021-12-11 PROCEDURE — 70450 CT HEAD/BRAIN W/O DYE: CPT | Performed by: HOSPITALIST

## 2021-12-11 PROCEDURE — 76770 US EXAM ABDO BACK WALL COMP: CPT | Performed by: HOSPITALIST

## 2021-12-11 RX ORDER — SODIUM CHLORIDE, SODIUM LACTATE, POTASSIUM CHLORIDE, CALCIUM CHLORIDE 600; 310; 30; 20 MG/100ML; MG/100ML; MG/100ML; MG/100ML
INJECTION, SOLUTION INTRAVENOUS CONTINUOUS
Status: DISCONTINUED | OUTPATIENT
Start: 2021-12-11 | End: 2021-12-14

## 2021-12-11 RX ORDER — DIAPER,BRIEF,INFANT-TODD,DISP
EACH MISCELLANEOUS 2 TIMES DAILY
Status: DISCONTINUED | OUTPATIENT
Start: 2021-12-11 | End: 2021-12-15

## 2021-12-11 NOTE — CONSULTS
[facility]    Report of Consultation    James Moyer Patient Status:  Inpatient    1950 MRN [id number]   Location [facility] 4W/SW/SE Attending Debi Dumont MD   [facility] Day # 2 PCP Jacqueline Ibarra DO     Date of consult:  12/09/2021    EXPLORATORY OF LAPAROTOMY, TOTAL ABDOMINAL HYSTERECTOMY/ BILATERAL SALPINGO-OOPHORECTOMY, POSSIBLE STAGING, DEBULKING, BOWEL RESECTION, OSTOMY, CYSTO BILATERAL URETERAL STENTS     • OBSTETRICAL CARE,VAG DELIV ONLY      x's 2   • REMOVAL OF TO oxyCODONE immediate release tab 5 mg, 5 mg, Oral, Q4H PRN **OR** oxyCODONE immediate release tab 10 mg, 10 mg, Oral, Q4H PRN  •  HYDROmorphone HCl (DILAUDID) 1 MG/ML injection 0.4 mg, 0.4 mg, Intravenous, Q2H PRN **OR** HYDROmorphone HCl (DILAUDID) 1 MG/ML 12/11/2021    RBC 3.50 (L) 12/11/2021    HGB 10.6 (L) 12/11/2021    HCT 32.6 (L) 12/11/2021    .0 12/11/2021    MCV 93.1 12/11/2021    MCH 30.3 12/11/2021    MCHC 32.5 12/11/2021    RDW 13.9 12/11/2021    NEPRELIM 7.69 12/11/2021    NEPERCENT 74.9 1 place  -- continue IVFs  -- encourage po intake   -- avoid nephrotoxins and renally dose emds    Anemia  -- monitor     D/w Dr Dani Stearns     Thank you for allowing me to participate in the care of your patient.  Please do not hesitate to contact me with con

## 2021-12-11 NOTE — PROGRESS NOTES
Surgical Oncology Inpatient Progress Note    Subjective:  POD 2 ex lap, BSO, pelvic mass biopsy yesterday  No acute events VSS      Objective:  Temp:  [98.1 °F (36.7 °C)-99.7 °F (37.6 °C)] 98.7 °F (37.1 °C)  Pulse:  [] 101  Resp:  [18-20] 18  BP: (11

## 2021-12-11 NOTE — PROGRESS NOTES
176 [facility] Progress Note      Anil Morales Patient Status:  Inpatient    1950 MRN [id number]   Location [facility] 4W/SW/SE Attending Bailey Bill MD   Hosp Day # 2 PCP Eric Alvares DO     Assessment & Plan: 12/11/2021    HGB 10.6 (L) 12/11/2021    HCT 32.6 (L) 12/11/2021    .0 12/11/2021    CREATSERUM 1.52 (H) 12/11/2021    BUN 14 12/11/2021     12/11/2021    K 3.6 12/11/2021     12/11/2021    CO2 26.0 12/11/2021     (H) 12/11/2021

## 2021-12-11 NOTE — PLAN OF CARE
No acute medical changes during the shift. Patient is A&O x4; forgetful at times. Prn oxy for pain control. Ambulating 1x with walker. Midline dressing in place with abdominal binder and JANETH drain to bulb suction. Garsia in place with red output.  Denies pain evaluate response  - Implement non-pharmacological measures as appropriate and evaluate response  - Consider cultural and social influences on pain and pain management  - Manage/alleviate anxiety  - Utilize distraction and/or relaxation techniques  - Monit managing their own health  - Refer to Case Management Department for coordinating discharge planning if the patient needs post-hospital services based on physician/LIP order or complex needs related to functional status, cognitive ability or social support

## 2021-12-11 NOTE — HOME CARE LIAISON
Received referral via [facility] for Home Health services. Spoke w/ pt and provided choice list from [facility]. Pt stated she is unsure of what the plan will be at discharge and would like for liaison to follow up closer to discharge. Notified ELVI/Alice FLOWER

## 2021-12-11 NOTE — PROGRESS NOTES
[facility] Hospitalist Progress Note     PCP: Deyanira Call DO    CC: Follow up       Assessment/Plan:   Ivan Lundborg is a 73year old female with PMH sig for hypertension, hyperlipidemia, hypothyroidism who presented for ex lap, BSO, and resection of retroper Thank Neto Tinoco MD 12/11/21  Hospitalist  [facility]   Answering service: [phone]          Subjective     Pain controlled, tolerating diet. No CP, SOB, or N/V.       Objective     OBJECTIVE:  Temp:  [98 °F (36.7 °C)-99.7 °F 12/11/21  0619 12/11/21  1209   NA  --  141 138 137   K 4.2 3.8 3.6 3.6   CL  --  105 104 102   CO2  --  26.0 26.0 28.0   BUN  --  11 14 13   CREATSERUM  --  1.08* 1.52* 1.53*   CA  --  8.6 8.4* 8.3*   GLU  --  98 109* 111*       No results for input(s): A

## 2021-12-11 NOTE — PLAN OF CARE
Pt alert and oriented x4. VSS. On room air. Lovenox for DVT prophylaxis. Advanced to FLD, tolerated, no nausea. +belch + flatus. -bm. Garsia with bloody output, urology MD made aware. Irrigated 3x due to low output, bladder scan at end of shift 224.  No clot response  - Consider cultural and social influences on pain and pain management  - Manage/alleviate anxiety  - Utilize distraction and/or relaxation techniques  - Monitor for opioid side effects  - Notify MD/LIP if interventions unsuccessful or patient rep discharge planning if the patient needs post-hospital services based on physician/LIP order or complex needs related to functional status, cognitive ability or social support system  Outcome: Progressing

## 2021-12-12 ENCOUNTER — APPOINTMENT (OUTPATIENT)
Dept: CT IMAGING | Facility: HOSPITAL | Age: 71
DRG: 827 | End: 2021-12-12
Attending: HOSPITALIST
Payer: MEDICARE

## 2021-12-12 PROCEDURE — 71250 CT THORAX DX C-: CPT | Performed by: HOSPITALIST

## 2021-12-12 NOTE — PLAN OF CARE
Pt oriented x3, forgetful. Pt states she is very sleepy today. Unable to recall what year we are in and states she was admitted to hospital for constipation. When prompted, able to recall that she was admitted this time for surgery. VSS. On room air.  Isabella Maldonado assistance  - Assess pain using appropriate pain scale  - Administer analgesics based on type and severity of pain and evaluate response  - Implement non-pharmacological measures as appropriate and evaluate response  - Consider cultural and social influenc patient/family/discharge partner  - Complete POLST form as appropriate  - Assess patient's ability to be responsible for managing their own health  - Refer to Case Management Department for coordinating discharge planning if the patient needs post-hospital

## 2021-12-12 NOTE — PROGRESS NOTES
[facility] HOSP - [facility]    OB/GYNE Post-OP Progress Note      Kristeen Levels Patient Status:  Inpatient    1950 MRN [id number]   Location [facility] 4W/SW/SE Attending Santa Valdes MD   Hosp Day # 3 PCP Román Burrell, DO     Date:   Yellow    Clarity Urine Cloudy (A) Clear    Spec Gravity 1.020 1.001 - 1.030    Glucose Urine 50  (A) Negative mg/dL    Bilirubin Urine Negative Negative    Ketones Urine Trace (A) Negative mg/dL    Blood Urine Large (A) Negative    pH Urine 6.0 5.0 - 8.0 HCT 29.4 (L) 35.0 - 48.0 %    MCV 94.5 80.0 - 100.0 fL    MCH 30.2 26.0 - 34.0 pg    MCHC 32.0 31.0 - 37.0 g/dL    RDW-SD 47.3 (H) 35.1 - 46.3 fL    RDW 13.7 11.0 - 15.0 %    .0 150.0 - 450.0 10(3)uL    Neutrophil Absolute Prelim 7.15 1.50 - 7.70 x1

## 2021-12-12 NOTE — PLAN OF CARE
No acute medical changes during the shift. Patient is A&O x3; forgetful at times. Ambulating 1x with walker. Midline dressing in place. Bryant in place with red output. Denies pain at bryant site; no clots noted during the night. Tolerating LF/S diet.  Passin and evaluate response  - Consider cultural and social influences on pain and pain management  - Manage/alleviate anxiety  - Utilize distraction and/or relaxation techniques  - Monitor for opioid side effects  - Notify MD/LIP if interventions unsuccessful o coordinating discharge planning if the patient needs post-hospital services based on physician/LIP order or complex needs related to functional status, cognitive ability or social support system  Outcome: Progressing     Problem: SKIN/TISSUE INTEGRITY - AD

## 2021-12-12 NOTE — PROGRESS NOTES
[facility] Hospitalist Progress Note     PCP: Román Burrell DO    CC: Follow up       Assessment/Plan:   Prem Dexter is a 73year old female with PMH sig for hypertension, hyperlipidemia, hypothyroidism who presented for ex lap, BSO, and resection of retroper counseling     Thank Zenaida Miguel MD 12/12/21  Hospitalist  [facility]   Answering service: [phone]          Subjective     Pain controlled, tolerating diet. No CP, SOB, or N/V.       Objective     OBJECTIVE:  Temp:  [97.7 °F (36.5 12/06/21  1320 12/10/21  0623 12/11/21  0619 12/11/21  1209 12/12/21 0619   NA  --  141 138 137 137   K 4.2 3.8 3.6 3.6 3.6   CL  --  105 104 102 104   CO2  --  26.0 26.0 28.0 25.0   BUN  --  11 14 13 13   CREATSERUM  --  1.08* 1.52* 1.53* 1.35*   CA  --

## 2021-12-12 NOTE — PLAN OF CARE
Pt oriented x4. Much less confused today. VSS. On room air. Tolerating LF/ soft diet. +flatus, + belch. Garsia draining to gravity, red output. IVF as ordered. Pt denies need for prn pain medication. Pt up with assist and RW in room and hallway.  Fall precau relaxation techniques  - Monitor for opioid side effects  - Notify MD/LIP if interventions unsuccessful or patient reports new pain  - Anticipate increased pain with activity and pre-medicate as appropriate  Outcome: Progressing     Problem: RISK FOR INFEC cognitive ability or social support system  Outcome: Progressing     Problem: SKIN/TISSUE INTEGRITY - ADULT  Goal: Incision(s), wounds(s) or drain site(s) healing without S/S of infection  Description: INTERVENTIONS:  - Assess and document risk factors for

## 2021-12-12 NOTE — PROGRESS NOTES
[facility]    Nephrology Progress Note    Jethro Morillo Attending:  Maritza Grullon MD     Cc: GURPREET    SUBJECTIVE     No complaints. Po intake poor.  Some red outpt noted in bryant, monitor     PHYSICAL EXAM   Vital signs: /69 (BP Locati release tab 10 mg, 10 mg, Oral, Q4H PRN  HYDROmorphone HCl (DILAUDID) 1 MG/ML injection 0.4 mg, 0.4 mg, Intravenous, Q2H PRN   Or  HYDROmorphone HCl (DILAUDID) 1 MG/ML injection 0.8 mg, 0.8 mg, Intravenous, Q2H PRN  acetaminophen (TYLENOL) tab 650 mg, 650 Sam Sousa MD ON 12/11/2021 AT 9:12 PM         FINALIZED BY (CST): Leigh Rene MD ON 12/11/2021 AT 9:19 PM                              CT BRAIN OR HEAD (72711)   Final Result    PROCEDURE: CT BRAIN OR HEAD (CPT=70450)         COMPARISON: None.          IND INDICATIONS: Bilateral stent placement. FINDINGS: 2 spot fluoroscopic images. Fluoroscopy time is 16.3 seconds.      Intraoperative fluoroscopy provided with no radiologist present.                        =====    CONCLUSION: Intraoperative fluoros

## 2021-12-13 PROCEDURE — 99231 SBSQ HOSP IP/OBS SF/LOW 25: CPT | Performed by: SURGERY

## 2021-12-13 NOTE — PROGRESS NOTES
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  This was seen as Age of Onset   • Heart Disorder Father    • Heart Disorder Mother         cardiac septal defect   • Hypertension Mother    • Other (Other) Sister         Crohn's disease   • Cancer Brother         pancreatic   • Stroke Maternal Grandmother    • Heart Attac 1.35* 1.42*   GFRAA 40* 46* 43*   GFRNAA 34* 40* 38*   CA 8.3* 7.9* 7.5*   ALB  --  2.2* 1.9*    137 137   K 3.6 3.6 3.5    104 102   CO2 28.0 25.0 28.0       Imagin.  Large amount of stool throughout the colon with well-formed ball of s salpingo-oophorectomy:  · Ovary showing unremarkable physiologic characteristics and no evidence of significant pathologic abnormalities  · Associated overlying fallopian tube showing scattered paratubal cysts and no evidence of significant pathologic abno

## 2021-12-13 NOTE — PROGRESS NOTES
[facility]    Nephrology Progress Note    Valentin Gutierrez Attending:  Santana Townsend MD     Cc: GURPREET    SUBJECTIVE     Still with Poor PO intake.  + LE edema.     PHYSICAL EXAM   Vital signs: /70 (BP Location: Left arm)   Pulse 83   Temp (ZOFRAN) tab 4 mg, 4 mg, Oral, Q8H PRN  oxyCODONE immediate release tab 5 mg, 5 mg, Oral, Q4H PRN   Or  oxyCODONE immediate release tab 10 mg, 10 mg, Oral, Q4H PRN  HYDROmorphone HCl (DILAUDID) 1 MG/ML injection 0.4 mg, 0.4 mg, Intravenous, Q2H PRN   Or  H CARDIAC: The heart is not enlarged. There is mild fluid in the superior     pericardial recess, likely physiologic. VASCULATURE: The thoracic aorta has unremarkable configuration without     aneurysmal dilatation.   There is mild calcific atherosclerosi on 12/12/2021 at 4:42 PM         Finalized by (CST): Irma Calvert MD on 12/12/2021 at 4:49 PM               US KIDNEY/BLADDER (HEO=65534)   Final Result    PROCEDURE: US KIDNEY/BLADDER (DJW=66681)         COMPARISON: Zunilda PILLAI SPACES: Ventricles, cisterns, and sulci are appropriate for age. No     hydrocephalus, subarachnoid hemorrhage, or mass. No midline shift. CEREBRUM: No edema, hemorrhage, mass, acute infarction, or significant     atrophy.       WHITE MATTER: Mild de analysis c/w possible sarcoma. Nephrology consulted for GURPREET     GURPREET   -- Could be prerenal vs atn from intraop hemodynamics. Also consider due to ureteral edema after removal per gyne notes.  Unclear if got any contrast systemically or just for stent placem

## 2021-12-13 NOTE — PROGRESS NOTES
Surgical Oncology Inpatient Progress Note    Subjective:  POD 4 ex lap, BSO, pelvic mass biopsy yesterday  No acute events VSS      Objective:  Temp:  [97.3 °F (36.3 °C)-97.7 °F (36.5 °C)] 97.3 °F (36.3 °C)  Pulse:  [80-85] 80  Resp:  [16-18] 18  BP: (131-

## 2021-12-13 NOTE — PROGRESS NOTES
[facility] Hospitalist Progress Note     PCP: Debi Munguia DO    CC: Follow up       Assessment/Plan:   Magalie Alicia is a 73year old female with PMH sig for hypertension, hyperlipidemia, hypothyroidism who presented for ex lap, BSO, and resection of retroper bedside.     D/w      >35 min spent in the care of this patient with > 50% in direct patient care with coordination and counseling     Thank You,  Emelina Sanderson, [address] [facility]   Answering service: [phone]          Josh Masters Labs:     Recent Labs   Lab 12/10/21  0623 12/11/21  0619 12/12/21  0619 12/13/21  0559   WBC 9.0 10.3 9.4 9.0   HGB 11.0* 10.6* 9.4* 8.7*   MCV 94.7 93.1 94.5 93.3   .0 243.0 215.0 220.0       Recent Labs   Lab 12/10/21  0623 12/11/21  0619 12 Dorinda Orantes MD on 12/09/2021 at 1:52 PM

## 2021-12-13 NOTE — PLAN OF CARE
No acute medical changes during the shift. Patient is A&O x4. Ambulating 1x with walker. Midline dressing in place. Bryant in place with red output. Denies pain at bryant site. Tolerating LF/S diet. Prn oxy for pain control. 1x BM during the night.  Passing g evaluate response  - Consider cultural and social influences on pain and pain management  - Manage/alleviate anxiety  - Utilize distraction and/or relaxation techniques  - Monitor for opioid side effects  - Notify MD/LIP if interventions unsuccessful or pa discharge planning if the patient needs post-hospital services based on physician/LIP order or complex needs related to functional status, cognitive ability or social support system  Outcome: Progressing     Problem: SKIN/TISSUE INTEGRITY - ADULT  Goal: In

## 2021-12-13 NOTE — PROGRESS NOTES
[facility] - [facility]    OB/GYNE Post-OP Progress Note      Apex Lose Patient Status:  Inpatient    1950 MRN [id number]   Location [facility] 4W/SW/SE Attending Lo Jacobs MD   [facility] Day # 4 PCP Jayy Wells,      Date:   1.9 (L) 3.4 - 5.0 g/dL    Phosphorus 2.1 (L) 2.5 - 4.9 mg/dL   Magnesium    Collection Time: 12/13/21  5:59 AM   Result Value Ref Range    Magnesium 2.2 1.6 - 2.6 mg/dL   CBC W/ DIFFERENTIAL    Collection Time: 12/13/21  5:59 AM   Result Value Ref Range while in bed. Encourage ambulation      Encourage po intake, will give 250cc NS bolus.          Path report: pending     VTE Risk    Padua VTE Risk Score:   Caprini VTE Risk Score:   Obstetric VTE Risk Score:        Hoda Polanco MD   12/13/2021 8:53 AM

## 2021-12-13 NOTE — OPERATIVE REPORT
[facility]    PATIENT'S NAME: Florian Farrell   ATTENDING PHYSICIAN: Jason Cameron MD   OPERATING PHYSICIAN: Jason Cameron MD   PATIENT ACCOUNT#:   [de-identified]    LOCATION:  [address] [place] [address] #:   [id number]       DATE OF BIRTH:  [birth year] counseled the patient on options including surgical resection versus biopsy of this mass. Given the size, we thought this was likely ovarian in nature.   Biopsies of these types of masses are generally not performed and, therefore, surgical resection was p dictation regarding placing of an internal stent on the left side and an external ureteral catheter on the right side, which was uncomplicated. Garsia catheter was then placed. I performed an exam under anesthesia as described above.     A midline vertical gynecologic malignancy. I decided to remove the remaining right ovary to ensure there was no malignancy originating from them. The right infundibulopelvic ligament was sealed and cut, and the right adnexa was  off the uterus.   Given the fact that

## 2021-12-14 RX ORDER — HYDROCODONE BITARTRATE AND ACETAMINOPHEN 5; 325 MG/1; MG/1
1-2 TABLET ORAL EVERY 4 HOURS PRN
Qty: 30 TABLET | Refills: 0 | Status: SHIPPED | OUTPATIENT
Start: 2021-12-14 | End: 2021-12-29

## 2021-12-14 RX ORDER — DOCUSATE SODIUM 100 MG/1
100 CAPSULE, LIQUID FILLED ORAL 2 TIMES DAILY
Qty: 60 CAPSULE | Refills: 0 | Status: SHIPPED | OUTPATIENT
Start: 2021-12-14

## 2021-12-14 RX ORDER — ENOXAPARIN SODIUM 100 MG/ML
40 INJECTION SUBCUTANEOUS DAILY
Qty: 30 EACH | Refills: 0 | Status: SHIPPED | OUTPATIENT
Start: 2021-12-14 | End: 2022-01-13

## 2021-12-14 NOTE — PROGRESS NOTES
[facility] Hospitalist Progress Note     PCP: Giselle Argueta DO    CC: Follow up       Assessment/Plan:   Ky Valdes is a 73year old female with PMH sig for hypertension, hyperlipidemia, hypothyroidism who presented for ex lap, BSO, and resection of retroper DC home when OK with attending    Questions/concerns were discussed with patient and/or family by bedside.     D/w      >35 min spent in the care of this patient with > 50% in direct patient care with coordination and counseling     Thank Joy Velasquez HYDROmorphone HCl **OR** HYDROmorphone HCl, acetaminophen    Data Review:       Labs:     Recent Labs   Lab 12/10/21  0623 12/11/21  0619 12/12/21  0619 12/13/21  0559 12/14/21  0610   WBC 9.0 10.3 9.4 9.0 7.5   HGB 11.0* 10.6* 9.4* 8.7* 8.7*   MCV 94.7 93 N/C    Result Date: 12/9/2021  CONCLUSION: Intraoperative fluoroscopy provided.      Dictated by (CST): Donnie Reed MD on 12/09/2021 at 1:51 PM     Finalized by (CST): Donnie Reed MD on 12/09/2021 at 1:52 PM

## 2021-12-14 NOTE — PROGRESS NOTES
Hematology/Oncology follow up Note      Subjective:  Sitting in chair  Pain controlled    at bedside     Oncologic History:   Patient is a 73 y.o female who is currently admitted POD #1 with large pelvic mass.  This was seen as an outpatient on imagi Disorder Father    • Heart Disorder Mother         cardiac septal defect   • Hypertension Mother    • Other (Other) Sister         Crohn's disease   • Cancer Brother         pancreatic   • Stroke Maternal Grandmother    • Heart Attack Maternal Grandfather 3.7    102 104   CO2 25.0 28.0 30.0       Imagin.  Large amount of stool throughout the colon with well-formed ball of stool in the rectum, consistent with given history of constipation.  There is circumferential rectal wall thickening suggest abnormalities  · Associated overlying fallopian tube showing scattered paratubal cysts and no evidence of significant pathologic abnormalities.     E.  External iliac lymph node; excisional biopsy:  · Lymph node demonstrating mixed follicular and paracortic

## 2021-12-14 NOTE — PLAN OF CARE
Pt oriented x4. Forgetful at times. VSS. On room air. Tolerating LF/ soft diet. +flatus, + belch, +BM. Encouraging PO intake. Bryant draining to gravity, pink output. Per OBGYN, keep bryant in until increased output. 250cc bolus + maintenance IVF as ordered. cultural and social influences on pain and pain management  - Manage/alleviate anxiety  - Utilize distraction and/or relaxation techniques  - Monitor for opioid side effects  - Notify MD/LIP if interventions unsuccessful or patient reports new pain  - Anti patient needs post-hospital services based on physician/LIP order or complex needs related to functional status, cognitive ability or social support system  Outcome: Progressing     Problem: SKIN/TISSUE INTEGRITY - ADULT  Goal: Incision(s), wounds(s) or dr

## 2021-12-14 NOTE — PROGRESS NOTES
176 [facility] Progress Note      Chaz Ratel Patient Status:  Inpatient    1950 MRN [id number]   Location [facility] 4W/SW/SE Attending Demetrio Eric MD   Hosp Day # 5 PCP Anuel Abrams DO     Assessment & Plan: ALB 1.9 (L) 12/14/2021    ALKPHO 85 09/09/2021    BILT 0.76 09/09/2021    TP 7.3 09/09/2021    AST 27 09/09/2021    ALT 25 09/09/2021    TSH 2.294 05/29/2018       Lab Results   Component Value Date    MG 2.2 12/14/2021    PHOS 2.9 12/14/2021    CK 69.0 03

## 2021-12-14 NOTE — PROGRESS NOTES
[facility]    Nephrology Progress Note    Selin Calvert Attending:  Anais Solares MD     Cc: GURPREET    SUBJECTIVE     Feeling better today  Renal function improved at this time.     PHYSICAL EXAM   Vital signs: BP (!) 146/91 (BP Location: Rig release tab 10 mg, 10 mg, Oral, Q4H PRN  HYDROmorphone HCl (DILAUDID) 1 MG/ML injection 0.4 mg, 0.4 mg, Intravenous, Q2H PRN   Or  HYDROmorphone HCl (DILAUDID) 1 MG/ML injection 0.8 mg, 0.8 mg, Intravenous, Q2H PRN  acetaminophen (TYLENOL) tab 650 mg, 650 VASCULATURE: The thoracic aorta has unremarkable configuration without     aneurysmal dilatation. There is mild calcific atherosclerosis involving     the aortic arch.     LUNGS/PLEURA: Trace pleural effusions, right greater than left, are new     from 11/ KIDNEY/BLADDER ([id number]=[id number])   Final Result    PROCEDURE: US KIDNEY/BLADDER (KAQ=[id number])         COMPARISON: Ottoniel1 Jordan MIRANDA (TRANSABDOMINAL PELVIS)     ([id number]), 11/18/2021, 7:55 PM.  [facility], [address] shift.      CEREBRUM: No edema, hemorrhage, mass, acute infarction, or significant     atrophy. WHITE MATTER: Mild decreased attenuation in the cerebral white matter     bilaterally.     CEREBELLUM: No edema, hemorrhage, mass, acute infarction, or sign due to ureteral edema after removal per gyne notes. Unclear if got any contrast systemically or just for stent placement (per MAR did get iopamidol on 12/9 but unclear amount)  -- Cr was 0.9s previously, was then 1.08 on 12/10 --> 1.53 on 12/11.  Now slight

## 2021-12-14 NOTE — CM/SW NOTE
Per chart review, possible DC tomorrow. Lovenox Rx sent to [employer] and requested call back to confirm coverage and availability.      EDGAR Tapia, [facility]    [id number]

## 2021-12-14 NOTE — PLAN OF CARE
Patient is resting in bed, A&O x4 but can be forgetful at times, VSS, and pain is being managed with oxy PRN. Tolerating diet with no complaints of nausea. IVF is infusing. Garsia is in place and emptying.  Patient is up with one and a walker going to the ba and evaluate response  - Consider cultural and social influences on pain and pain management  - Manage/alleviate anxiety  - Utilize distraction and/or relaxation techniques  - Monitor for opioid side effects  - Notify MD/LIP if interventions unsuccessful o coordinating discharge planning if the patient needs post-hospital services based on physician/LIP order or complex needs related to functional status, cognitive ability or social support system  Outcome: Progressing     Problem: SKIN/TISSUE INTEGRITY - AD

## 2021-12-15 ENCOUNTER — APPOINTMENT (OUTPATIENT)
Dept: CV DIAGNOSTICS | Facility: HOSPITAL | Age: 71
DRG: 827 | End: 2021-12-15
Attending: INTERNAL MEDICINE
Payer: MEDICARE

## 2021-12-15 VITALS
BODY MASS INDEX: 30.49 KG/M2 | HEIGHT: 65 IN | TEMPERATURE: 98 F | DIASTOLIC BLOOD PRESSURE: 84 MMHG | RESPIRATION RATE: 18 BRPM | OXYGEN SATURATION: 98 % | WEIGHT: 183 LBS | SYSTOLIC BLOOD PRESSURE: 144 MMHG | HEART RATE: 75 BPM

## 2021-12-15 PROBLEM — C7A.8 NEUROENDOCRINE CANCER (HCC): Status: ACTIVE | Noted: 2021-12-15

## 2021-12-15 PROBLEM — C80.0: Status: ACTIVE | Noted: 2021-12-15

## 2021-12-15 PROBLEM — Z01.818 PREOP TESTING: Status: RESOLVED | Noted: 2021-12-09 | Resolved: 2021-12-15

## 2021-12-15 PROCEDURE — 93306 TTE W/DOPPLER COMPLETE: CPT | Performed by: INTERNAL MEDICINE

## 2021-12-15 NOTE — HOME CARE LIAISON
Patient provided with list of [facility] providers from [facility], [facility] interest disclosure provided to patient. Patient and spouse are declining [place] at this time. DEANNA Palomares updated.

## 2021-12-15 NOTE — PLAN OF CARE
ARENDAL is A/O x4 but forgetful. Vitals stable. Surgical dressing removed and replaced with dry dressing per Dr. Fozia Thompson. Tolerating cardiac diet. Ambulating with assist and walker. Quan Lance ordered for home use. Pain control with oxy and tylenol.  Lovenox for pain and evaluate response  - Implement non-pharmacological measures as appropriate and evaluate response  - Consider cultural and social influences on pain and pain management  - Manage/alleviate anxiety  - Utilize distraction and/or relaxation techniques patient's ability to be responsible for managing their own health  - Refer to Case Management Department for coordinating discharge planning if the patient needs post-hospital services based on physician/LIP order or complex needs related to functional sta

## 2021-12-15 NOTE — CM/SW NOTE
DC order present    Patient is declining home health services at this time. SW received call back from pharmacy. Lovenox is covered by insurance ($180 copay, no prior auth required). PLAN: Declined home health, will return home w family care.        Rolf Early

## 2021-12-15 NOTE — PROGRESS NOTES
Hematology/Oncology follow up Note      Subjective:  No new events overnight  Feels well , renal function improved   Likely discharge today      Oncologic History:   Patient is a 73 y.o female who is currently admitted POD #1 with large pelvic mass.  This w Relation Age of Onset   • Heart Disorder Father    • Heart Disorder Mother         cardiac septal defect   • Hypertension Mother    • Other (Other) Sister         Crohn's disease   • Cancer Brother         pancreatic   • Stroke Maternal Grandmother    • He 28.0 30.0 29.0       Imagin. Large amount of stool throughout the colon with well-formed ball of stool in the rectum, consistent with given history of constipation.  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 overlying fallopian tube showing scattered paratubal cysts and no evidence of significant pathologic abnormalities.     E.  External iliac lymph node; excisional biopsy:  · Lymph node demonstrating mixed follicular and paracortical reactive lymphoid cell hy allowing me to participate in the care of this patient, please call with any questions.     Julita Wesley M.D.  [facility]

## 2021-12-15 NOTE — PLAN OF CARE
Pt A/O x4. Tolerating low fiber soft diet. Given oxy prn for pain. Voiding freely. Up with 1 and RW. Saline locked. SCDs. Call light within reach. Safety precautions in place.      Problem: Patient Centered Care  Goal: Patient preferences are identified and unsuccessful or patient reports new pain  - Anticipate increased pain with activity and pre-medicate as appropriate  Outcome: Progressing     Problem: RISK FOR INFECTION - ADULT  Goal: Absence of fever/infection during anticipated neutropenic period  Descr INTEGRITY - ADULT  Goal: Incision(s), wounds(s) or drain site(s) healing without S/S of infection  Description: INTERVENTIONS:  - Assess and document risk factors for pressure ulcer development  - Assess and document skin integrity  - Assess and document d

## 2021-12-15 NOTE — PLAN OF CARE
Problem: Patient Centered Care  Goal: Patient preferences are identified and integrated in the patient's plan of care  Description: Interventions:  - What would you like us to know as we care for you?  From home with   - Provide timely, complete, a Absence of fever/infection during anticipated neutropenic period  Description: INTERVENTIONS  - Monitor WBC  - Administer growth factors as ordered  - Implement neutropenic guidelines  Outcome: Progressing     Problem: SAFETY ADULT - FALL  Goal: Free from development  - Assess and document skin integrity  - Assess and document dressing/incision, wound bed, drain sites and surrounding tissue  - Implement wound care per orders  - Initiate isolation precautions as appropriate  - Initiate Pressure Ulcer prevent

## 2021-12-15 NOTE — DISCHARGE SUMMARY
[address] [facility] Discharge Summary     Patient ID:  Alka Schmid  73year old  7/29/1950    Admit date: 12/9/2021    Discharge date: 12/15/2021    Attending Physician: Christina Anderson MD     Consults: IP CONSULT TO HOSPITALIST  IP CONSULT TO SURGICAL need outpatient PET Scan, plan for port-placement and further chemoRx outpatient     Confusion on 12/11  -per RN in bathroom, confused, restless  -CT head neg on 12/11 and on 12/12 MS much improved.       GURPREET  -mild bump to 1.5, repeat 1.5->1.3->1.4->0.9 COLACE  Take 1 capsule (100 mg total) by mouth 2 (two) times daily. enoxaparin 40 MG/0.4ML Soln  Commonly known as: LOVENOX  Inject 0.4 mL (40 mg total) into the skin daily.      HYDROcodone-acetaminophen 5-325 MG Tabs  Commonly known as: NORCO  Take 1- [place] [id number]             Logan Green MD In 1 week. Specialty: ONCOLOGY  Contact information:  [address] [facility] [address]             Ramin Aquino DO In 1 week.     Specialties: Family M

## 2021-12-21 ENCOUNTER — TELEPHONE (OUTPATIENT)
Dept: SURGERY | Facility: CLINIC | Age: 71
End: 2021-12-21

## 2021-12-21 NOTE — TELEPHONE ENCOUNTER
Called to discuss tumor board recommendations. Agree with Dr. Peyton Anand plan to proceed with PET and chemoRT vs systemic therapy. If the patient requires resection or palliation down the line, I am happy of course to be involved.      Kylah Rice [place]

## 2022-01-13 PROBLEM — C55: Status: ACTIVE | Noted: 2022-01-13

## 2022-01-19 PROBLEM — E27.8 OTHER SPECIFIED DISORDERS OF ADRENAL GLAND (HCC): Status: ACTIVE | Noted: 2022-01-19

## 2022-01-19 PROBLEM — E27.8 OTHER SPECIFIED DISORDERS OF ADRENAL GLAND (HCC): Status: RESOLVED | Noted: 2022-01-19 | Resolved: 2022-01-19

## 2022-04-01 PROBLEM — E87.6 HYPOKALEMIA: Status: ACTIVE | Noted: 2022-04-01

## 2022-10-12 ENCOUNTER — HOSPITAL ENCOUNTER (EMERGENCY)
Facility: HOSPITAL | Age: 72
Discharge: HOME | End: 2022-10-12
Attending: EMERGENCY MEDICINE
Payer: MEDICARE

## 2022-10-12 VITALS
OXYGEN SATURATION: 98 % | SYSTOLIC BLOOD PRESSURE: 138 MMHG | HEART RATE: 69 BPM | BODY MASS INDEX: 25.95 KG/M2 | RESPIRATION RATE: 18 BRPM | TEMPERATURE: 98 F | WEIGHT: 152 LBS | HEIGHT: 64 IN | DIASTOLIC BLOOD PRESSURE: 81 MMHG

## 2022-10-12 DIAGNOSIS — L23.3 ALLERGIC CONTACT DERMATITIS DUE TO DRUGS IN CONTACT WITH SKIN: Primary | ICD-10-CM

## 2022-10-12 PROCEDURE — 99282 EMERGENCY DEPT VISIT SF MDM: CPT

## 2022-10-12 NOTE — ED INITIAL ASSESSMENT (HPI)
Cat bite to left arm, feral cat and on clindamycin. Pt reports redness, swelling and pain has not improved. Pt had to skip immunotherapy session today due to cat bite. Pt unable to recall last tetanus shot.

## (undated) DEVICE — SOL H2O 1000ML BTL

## (undated) DEVICE — SOL  .9 3000ML

## (undated) DEVICE — LAPAROTOMY: Brand: MEDLINE INDUSTRIES, INC.

## (undated) DEVICE — OPEN-END URETERAL CATHETER SOF-FLEX: Brand: SOF-FLEX

## (undated) DEVICE — BLADE ELECTRODE: Brand: EDGE

## (undated) DEVICE — TUBING CYSTO TUR DUAL

## (undated) DEVICE — SUTURE VICRYL 0 J906G

## (undated) DEVICE — SUTURE MONOCRYL 4-0 PS-2

## (undated) DEVICE — ENSEAL 20 CM SHAFT, LARGE JAW: Brand: ENSEAL X1

## (undated) DEVICE — CONTAINER SPEC STR 4OZ GRY LID

## (undated) DEVICE — DRAPE SHEET LAPCHOLE 124X100X7

## (undated) DEVICE — INSULATED BLADE ELECTRODE 6.5

## (undated) DEVICE — A P RESECTION: Brand: MEDLINE INDUSTRIES, INC.

## (undated) DEVICE — TROCAR: Brand: KII FIOS FIRST ENTRY

## (undated) DEVICE — DRAIN SILICONE FLAT 10MM

## (undated) DEVICE — [HIGH FLOW INSUFFLATOR,  DO NOT USE IF PACKAGE IS DAMAGED,  KEEP DRY,  KEEP AWAY FROM SUNLIGHT,  PROTECT FROM HEAT AND RADIOACTIVE SOURCES.]: Brand: PNEUMOSURE

## (undated) DEVICE — 3M™ STERI-DRAPE™ INCISE DRAPE 1040 (35CM X 35CM): Brand: STERI-DRAPE™

## (undated) DEVICE — DRAPE SHEET LG

## (undated) DEVICE — DRAPE SHEET LAVH 124X112X30

## (undated) DEVICE — FLOSEAL HEMOSTATIC MATRIX, 5ML: Brand: FLOSEAL HEMOSTATIC MATRIX

## (undated) DEVICE — CATH SECURING DEVICE STATLOCK

## (undated) DEVICE — TIGERTAIL 6F FLXTIP 70CM

## (undated) DEVICE — SUTURE PROLENE 3-0 SH

## (undated) DEVICE — GAMMEX® PI HYBRID SIZE 8, STERILE POWDER-FREE SURGICAL GLOVE, POLYISOPRENE AND NEOPRENE BLEND: Brand: GAMMEX

## (undated) DEVICE — ADAPTER URETERAL CATH CONCT

## (undated) DEVICE — UNDYED BRAIDED (POLYGLACTIN 910), SYNTHETIC ABSORBABLE SUTURE: Brand: COATED VICRYL

## (undated) DEVICE — VISUALIZATION SYSTEM: Brand: CLEARIFY

## (undated) DEVICE — TROCAR: Brand: KII® SLEEVE

## (undated) DEVICE — SUTURE SILK 3-0 SH

## (undated) DEVICE — USE ITEM #176901

## (undated) DEVICE — SUTURE VICRYL 0 CT-1

## (undated) DEVICE — GAMMEX® PI HYBRID SIZE 7.5, STERILE POWDER-FREE SURGICAL GLOVE, POLYISOPRENE AND NEOPRENE BLEND: Brand: GAMMEX

## (undated) DEVICE — PROXIMATE SKIN STAPLERS (35 WIDE) CONTAINS 35 STAINLESS STEEL STAPLES (FIXED HEAD): Brand: PROXIMATE

## (undated) DEVICE — DRAPE SRG 131X112X63IN GYN URO

## (undated) DEVICE — DISPOSABLE LAPAROSCOPIC CLIP APPLIER WITH 20 CLIPS.: Brand: EPIX® UNIVERSAL CLIP APPLIER

## (undated) DEVICE — FLEXIBLE YANKAUER,MEDIUM TIP, NO VACUUM CONTROL: Brand: ARGYLE

## (undated) DEVICE — LEGGINGS SRG 48X31IN CUF STRL

## (undated) DEVICE — Device

## (undated) DEVICE — TISSUE RETRIEVAL SYSTEM: Brand: INZII RETRIEVAL SYSTEM

## (undated) DEVICE — LUBRICANT JLY EZ 4OZ BCTRST H2

## (undated) DEVICE — DRAIN RESERVOIR RELIAVAC 100CC

## (undated) DEVICE — TRAY SKIN PREP PVP-1

## (undated) DEVICE — LAP CHOLE: Brand: MEDLINE INDUSTRIES, INC.

## (undated) DEVICE — SUTURE VICRYL 0 UR-6

## (undated) DEVICE — SOL  .9 1000ML BTL

## (undated) DEVICE — GOWN SURG AERO BLUE PERF XLG

## (undated) DEVICE — LIGACLIP MCA MULTIPLE CLIP APPLIERS, 20 LARGE CLIPS: Brand: LIGACLIP

## (undated) DEVICE — TROCARS: Brand: KII® BALLOON BLUNT TIP SYSTEM

## (undated) DEVICE — DRAPE,UNDRBUT,WHT GRAD PCH,CAPPORT,20/CS: Brand: MEDLINE

## (undated) DEVICE — Device: Brand: DISPOSABLE BULB & BLADDER

## (undated) DEVICE — ZIPWIRE GUIDEWIRE .035X150 STR

## (undated) DEVICE — SOL H2O 3000ML IRRIG

## (undated) NOTE — LETTER
[address] [place] [address]      Authorization for Surgical Operation and Procedure     Date:___________                                                                                                         Time:_______ still be subject to ill effects as a result of receiving a blood transfusion and/or blood products.   The following are some, but not all, of the potential risks that can occur: fever and allergic reactions, hemolytic reactions, transmission of diseases suc recovery period unless otherwise explicitly stated by me (or a person authorized to consent on my behalf). The surgeon or my attending physician will determine when the applicable recovery period ends for purposes of reinstating the DNAR order.   10. Evelyn other significant relationship used in this procedure/surgery, I have disclosed this and had a discussion with my patient.     _______________________________________________________________ _____________________________  Tatum Pill of Physician)